# Patient Record
Sex: FEMALE | NOT HISPANIC OR LATINO | URBAN - METROPOLITAN AREA
[De-identification: names, ages, dates, MRNs, and addresses within clinical notes are randomized per-mention and may not be internally consistent; named-entity substitution may affect disease eponyms.]

---

## 2024-05-08 ENCOUNTER — DOCUMENTATION ONLY (OUTPATIENT)
Dept: GERIATRICS | Facility: CLINIC | Age: 84
End: 2024-05-08
Payer: MEDICARE

## 2024-05-09 ENCOUNTER — TRANSITIONAL CARE UNIT VISIT (OUTPATIENT)
Dept: GERIATRICS | Facility: CLINIC | Age: 84
End: 2024-05-09
Payer: MEDICARE

## 2024-05-09 VITALS
HEART RATE: 91 BPM | SYSTOLIC BLOOD PRESSURE: 154 MMHG | RESPIRATION RATE: 17 BRPM | HEIGHT: 64 IN | BODY MASS INDEX: 47.29 KG/M2 | TEMPERATURE: 97.2 F | OXYGEN SATURATION: 94 % | WEIGHT: 277 LBS | DIASTOLIC BLOOD PRESSURE: 76 MMHG

## 2024-05-09 DIAGNOSIS — R52 PAIN: ICD-10-CM

## 2024-05-09 DIAGNOSIS — M62.81 GENERALIZED MUSCLE WEAKNESS: ICD-10-CM

## 2024-05-09 DIAGNOSIS — I89.0 CHRONIC ACQUIRED LYMPHEDEMA: ICD-10-CM

## 2024-05-09 DIAGNOSIS — S06.5XAA SUBDURAL HEMATOMA (H): ICD-10-CM

## 2024-05-09 DIAGNOSIS — Z85.42 HISTORY OF UTERINE CANCER: ICD-10-CM

## 2024-05-09 DIAGNOSIS — Z99.89 USES WALKER: ICD-10-CM

## 2024-05-09 DIAGNOSIS — S02.40CD CLOSED FRACTURE OF RIGHT SIDE OF MAXILLA WITH ROUTINE HEALING, SUBSEQUENT ENCOUNTER: ICD-10-CM

## 2024-05-09 DIAGNOSIS — N18.31 STAGE 3A CHRONIC KIDNEY DISEASE (H): ICD-10-CM

## 2024-05-09 DIAGNOSIS — Z86.718 PERSONAL HISTORY OF DVT (DEEP VEIN THROMBOSIS): ICD-10-CM

## 2024-05-09 DIAGNOSIS — E66.01 MORBID OBESITY (H): ICD-10-CM

## 2024-05-09 DIAGNOSIS — R26.9 ABNORMAL GAIT: ICD-10-CM

## 2024-05-09 DIAGNOSIS — Z79.84 DIABETES MELLITUS TREATED WITH ORAL MEDICATION (H): ICD-10-CM

## 2024-05-09 DIAGNOSIS — Z79.4 TYPE 2 DIABETES MELLITUS WITH STAGE 3A CHRONIC KIDNEY DISEASE, WITH LONG-TERM CURRENT USE OF INSULIN (H): ICD-10-CM

## 2024-05-09 DIAGNOSIS — Z91.81 PERSONAL HISTORY OF FALL: ICD-10-CM

## 2024-05-09 DIAGNOSIS — S72.401D CLOSED FRACTURE OF DISTAL END OF RIGHT FEMUR WITH ROUTINE HEALING, UNSPECIFIED FRACTURE MORPHOLOGY, SUBSEQUENT ENCOUNTER: Primary | ICD-10-CM

## 2024-05-09 DIAGNOSIS — F32.A DEPRESSION, UNSPECIFIED DEPRESSION TYPE: ICD-10-CM

## 2024-05-09 DIAGNOSIS — I10 BENIGN ESSENTIAL HYPERTENSION: ICD-10-CM

## 2024-05-09 DIAGNOSIS — S32.010D COMPRESSION FRACTURE OF L1 VERTEBRA WITH ROUTINE HEALING, SUBSEQUENT ENCOUNTER: ICD-10-CM

## 2024-05-09 DIAGNOSIS — Z86.73 HISTORY OF TIA (TRANSIENT ISCHEMIC ATTACK) AND STROKE: ICD-10-CM

## 2024-05-09 DIAGNOSIS — E11.22 TYPE 2 DIABETES MELLITUS WITH STAGE 3A CHRONIC KIDNEY DISEASE, WITH LONG-TERM CURRENT USE OF INSULIN (H): ICD-10-CM

## 2024-05-09 DIAGNOSIS — S00.83XD CONTUSION OF OTHER PART OF HEAD, SUBSEQUENT ENCOUNTER: ICD-10-CM

## 2024-05-09 DIAGNOSIS — E11.9 DIABETES MELLITUS TREATED WITH ORAL MEDICATION (H): ICD-10-CM

## 2024-05-09 DIAGNOSIS — Z47.89 ORTHOPEDIC AFTERCARE: ICD-10-CM

## 2024-05-09 DIAGNOSIS — R53.81 PHYSICAL DECONDITIONING: ICD-10-CM

## 2024-05-09 DIAGNOSIS — S01.81XD FACIAL LACERATION, SUBSEQUENT ENCOUNTER: ICD-10-CM

## 2024-05-09 DIAGNOSIS — N18.31 TYPE 2 DIABETES MELLITUS WITH STAGE 3A CHRONIC KIDNEY DISEASE, WITH LONG-TERM CURRENT USE OF INSULIN (H): ICD-10-CM

## 2024-05-09 PROCEDURE — 99310 SBSQ NF CARE HIGH MDM 45: CPT | Performed by: NURSE PRACTITIONER

## 2024-05-09 RX ORDER — SENNOSIDES A AND B 8.6 MG/1
8.6 TABLET, FILM COATED ORAL 2 TIMES DAILY
COMMUNITY
Start: 2024-05-08

## 2024-05-09 RX ORDER — PIOGLITAZONEHYDROCHLORIDE 30 MG/1
1 TABLET ORAL EVERY MORNING
COMMUNITY
Start: 2023-04-11

## 2024-05-09 RX ORDER — ATORVASTATIN CALCIUM 20 MG/1
1 TABLET, FILM COATED ORAL AT BEDTIME
COMMUNITY
Start: 2023-04-11

## 2024-05-09 RX ORDER — OXYCODONE HYDROCHLORIDE 5 MG/1
5 TABLET ORAL 2 TIMES DAILY
COMMUNITY
Start: 2024-05-08 | End: 2024-05-14

## 2024-05-09 RX ORDER — NYSTATIN 100000 U/G
CREAM TOPICAL 2 TIMES DAILY
COMMUNITY
End: 2024-05-28

## 2024-05-09 RX ORDER — MAGNESIUM OXIDE 400 MG/1
400 TABLET ORAL DAILY
COMMUNITY

## 2024-05-09 RX ORDER — ANTACID TABLETS 500 MG/1
2 TABLET, CHEWABLE ORAL 2 TIMES DAILY
COMMUNITY
End: 2024-05-09

## 2024-05-09 RX ORDER — MULTIVIT-MIN/FA/LYCOPEN/LUTEIN .4-300-25
1 TABLET ORAL DAILY
COMMUNITY
Start: 2024-05-09

## 2024-05-09 RX ORDER — FAMOTIDINE 40 MG/1
20 TABLET, FILM COATED ORAL 2 TIMES DAILY
COMMUNITY
Start: 2023-04-11

## 2024-05-09 RX ORDER — GLIMEPIRIDE 1 MG/1
2 TABLET ORAL
COMMUNITY

## 2024-05-09 RX ORDER — CALCIUM CARBONATE 500(1250)
500 TABLET ORAL 2 TIMES DAILY
COMMUNITY
Start: 2024-05-08

## 2024-05-09 RX ORDER — FUROSEMIDE 20 MG
20 TABLET ORAL DAILY
COMMUNITY
Start: 2024-04-11

## 2024-05-09 RX ORDER — CITALOPRAM HYDROBROMIDE 10 MG/1
1 TABLET ORAL EVERY MORNING
COMMUNITY
Start: 2023-04-11

## 2024-05-09 RX ORDER — ERGOCALCIFEROL 1.25 MG/1
50000 CAPSULE, LIQUID FILLED ORAL WEEKLY
COMMUNITY
End: 2024-06-13

## 2024-05-09 RX ORDER — POLYETHYLENE GLYCOL 3350 17 G/17G
17 POWDER, FOR SOLUTION ORAL DAILY
COMMUNITY
Start: 2024-05-08

## 2024-05-09 NOTE — PROGRESS NOTES
[facility] GERIATRICS    PRIMARY CARE PROVIDER AND CLINIC:  Physician No Ref-Primary, No address on file  Chief Complaint   Patient presents with    Hospital F/U      [place] Medical Record Number:  [id number]  Place of Service where encounter took place:  [facility] (TCU) [[id number]]    Alana Sheikh  is a 84 year old  (1940), admitted to the above facility from  [facility]. Hospital stay  5/1/2024 through 5/8/2024..       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  Not on anticoagulation    On 5/1/2024, patient presented in the [facility] emergency room after mechanical fall resulting in facial trauma, SDH and a right femur fracture.  She was transferred to [facility].   She underwent IMN on 5/2/2024.  Anticoagulation was held due to head bleed and has follow-up with neurosurgery on 5/10/2024.    Today she was in her room with her nephew who lives in [facility]  He has ecchymosis on her right face  Laceration on above left ey  Bandage covering incision      CODE STATUS/ADVANCE DIRECTIVES DISCUSSION:  No Order  CPR/Full code   ALLERGIES:   Allergies   Allergen Reactions    Metformin       PAST MEDICAL HISTORY: No past medical history on file.   PAST SURGICAL HISTORY:   has no past surgical history on file.  FAMILY HISTORY: family history is not on file.  SOCIAL HISTORY:     Patient's living condition: lives in an assisted living facility    Post Discharge Medication  "Reconciliation Status:   MED REC REQUIRED  Post Medication Reconciliation Status:  Discharge medications reconciled and changed, see notes/orders       Current Outpatient Medications   Medication Sig Dispense Refill    acetaminophen 500 MG CAPS Take 2 capsules by mouth 3 times daily      atorvastatin (LIPITOR) 20 MG tablet Take 1 tablet by mouth at bedtime      calcium carbonate 500 mg, elemental, (OSCAL) 500 MG tablet Take 500 mg by mouth 2 times daily      citalopram (CELEXA) 10 MG tablet Take 1 tablet by mouth every morning      famotidine (PEPCID) 40 MG tablet Take 20 mg by mouth 2 times daily      furosemide (LASIX) 20 MG tablet Take 20 mg by mouth daily as needed (weight gain or leg swelling)      glimepiride (AMARYL) 1 MG tablet Take 2 mg by mouth every morning (before breakfast)      magnesium oxide (MAG-OX) 400 MG tablet Take 400 mg by mouth daily      Multiple Vitamins-Minerals (CEROVITE SENIOR) TABS Take 1 tablet by mouth daily      nystatin (MYCOSTATIN) 226386 UNIT/GM external cream Apply topically 2 times daily      oxyCODONE (ROXICODONE) 5 MG tablet Take 5 mg by mouth 2 times daily      pioglitazone (ACTOS) 30 MG tablet Take 1 tablet by mouth every morning      polyethylene glycol (PEG 3350) 17 g packet Take 17 g by mouth daily      senna (SENOKOT) 8.6 MG tablet Take 8.6 mg by mouth 2 times daily      vitamin D2 (ERGOCALCIFEROL) 65656 units (1250 mcg) capsule Take 50,000 Units by mouth once a week       No current facility-administered medications for this visit.       ROS:  4 point ROS including Respiratory, CV, GI and , other than that noted in the HPI,  is negative    Vitals:  BP (!) 154/76   Pulse 91   Temp 97.2  F (36.2  C)   Resp 17   Ht 1.615 m (5' 3.6\")   Wt 125.6 kg (277 lb)   SpO2 94%   BMI 48.15 kg/m    Exam:  GENERAL APPEARANCE:  Alert, in no distress, morbidly obese  RESP:  lungs clear to auscultation , no respiratory distress  CV:  Palpation and auscultation of heart done , " rate-normal  M/S:   right leg wrapped and has a brace  SKIN:  incisions on right leg covered with CDI dressing  PSYCH:  oriented X 3    Lab/Diagnostic data:  Recent labs in [facility] reviewed by me today.     ASSESSMENT/PLAN:    (S75.075D) Closed fracture of distal end of right femur with routine healing, unspecified fracture morphology, subsequent encounter  (primary encounter diagnosis)  (Z47.89) Orthopedic aftercare  (M62.81) Generalized muscle weakness  (R53.81) Physical deconditioning  (Z91.81) Personal history of fall  (R26.9) Abnormal gait  (Z99.89) Uses walker  (R52) Pain  Comment:   On 5/1/2024, patient fell resulting in facial trauma, SDH and a right femur fracture.  She was transferred to [facility].   She underwent IMN on 5/2/2024.   DVT prophylaxis - on hold due to SAH.  Sees neurology on 05/10/24  Pain Medications - has oxycodone prn and scheduled Tylenol 1000 mg TID   Bowel Medications - takes senna BID and miralax   Bone Health - takes calcium and Vitamin D  Weight bearing status - NWB RLE, no aggressive knee motion.    Incision - covered with CDI dressing  Follow up - 2 weeks with either [facility] ortho or  ortho NP/PA with xrays prior AP/ Pelvis, Ap/ Lateral of the hip   Follow up - 6 weeks with either [facility] ortho or [facility]o NP/PA with  xrays prior AP/ Pelvis, Ap/ Lateral of the hip   Plan: BMP and CBC next lab day  Therapy to evaluate and treat  [facility] Orthopedic Date & Time of Appointment: 5/17/24 @8:50 AM Address: [address] [place] [address],    (S06.5XAA) Subdural hematoma (H)  (S02.40CD) Closed fracture of right side of maxilla with routine healing, subsequent encounter  (S00.83XD) Contusion of other part of head, subsequent encounter  (S01.81XD) Facial laceration, subsequent encounter  (Z86.73) History of TIA (transient ischemic attack) and stroke  (Z86.718) Personal history of DVT (deep vein thrombosis)  Comment:   pt fell hit head was on Plavix at that time. S/p fall on no anticoagulin at this time.   Per  ENT, Fracture only minimally displaced and non-operative management was recommended with soft diet.   Pt received 2 unit PRBC in hospital  Plan:    [facility] Date & Time of Appointment: 5/10/24 @ 7:30 AM Phone Number: [phone] Address: [address] 4th floor   Kendrick Chrissy REEDER Date & Time of Appointment: 5/10/24 @ 8:10 AM Phone Number: [phone] Address: [address] 3rd floor [facility] Date & Time of Appointment: 5/10/24 @ 9:10 AM Phone Number: [phone] Address: [address] [place], [place]  Dental referral for new upper dentures    (S32.010D) Compression fracture of L1 vertebra with routine healing, subsequent encounter  Comment: chronic  Tylenol for pain   Plan: Continue with plan of care no changes at this time, adjustment as needed      (I10) Benign essential hypertension  Comment: chronic  Taking lisinopril  Plan: monitor BMP    (N18.31) Stage 3a chronic kidney disease (H)  Comment: chronic  Baseline Cr ~0.8, GFR >60   Plan: avoid nephrotoxic medications  Monitor BMP     (E11.22,  N18.31,  Z79.4) Type 2 diabetes mellitus with stage 3a chronic kidney disease, with long-term current use of insulin (H)  (E66.01) Morbid obesity (H)  (E11.9,  Z79.84) Diabetes mellitus treated with oral medication (H)  Comment: chronic  HgbA1c 6.9% 5/3/24   Taking Actos and Amaryl   Plan: monitor blood sugars     (I89.0) Chronic acquired lymphedema  Comment: chronic  Taking levothyroxine.   Plan: monitor TSH      (F32.A) Depression, unspecified depression type  Comment: chronic.   Taking citalopram  Plan: Continue with plan of care no changes at this time, adjustment as needed      (Z85.42) History of uterine cancer  Comment: hx of   Plan: Continue with plan of care no changes at this time, adjustment as needed        Total time spent with patient visit at the skilled nursing facility was 45 min including patient visit, review of past records, and [facility] hospital records.     Electronically  signed by:      Althea Crowder, CLIF CNP on 5/9/2024 at 5:04 PM

## 2024-05-09 NOTE — LETTER
5/9/2024        RE: Alana Sheikh  [address]  [place] [address]        [facility] GERIATRICS    PRIMARY CARE PROVIDER AND CLINIC:  Physician No Ref-Primary, No address on file  Chief Complaint   Patient presents with     Hospital F/U      [place] Medical Record Number:  [id number]  Place of Service where encounter took place:  [facility] (TCU) [[id number]]    Alana Sheikh  is a 84 year old  (1940), admitted to the above facility from  [facility]. Hospital stay  5/1/2024 through 5/8/2024..       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  Not on anticoagulation    On 5/1/2024, patient presented in the [facility] emergency room after mechanical fall resulting in facial trauma, SDH and a right femur fracture.  She was transferred to [facility].   She underwent IMN on 5/2/2024.  Anticoagulation was held due to head bleed and has follow-up with neurosurgery on 5/10/2024.    Today she was in her room with her nephew who lives in [facility]  He has ecchymosis on her right face  Laceration on above left ey  Bandage covering incision      CODE STATUS/ADVANCE DIRECTIVES DISCUSSION:  No Order  CPR/Full code   ALLERGIES:   Allergies   Allergen Reactions     Metformin       PAST MEDICAL HISTORY: No past medical history on file.   PAST SURGICAL HISTORY:   has no past surgical history on file.  FAMILY HISTORY: family history is not on file.  SOCIAL HISTORY:     Patient's  "living condition: lives in an assisted living facility    Post Discharge Medication Reconciliation Status:   MED REC REQUIRED  Post Medication Reconciliation Status:  Discharge medications reconciled and changed, see notes/orders       Current Outpatient Medications   Medication Sig Dispense Refill     acetaminophen 500 MG CAPS Take 2 capsules by mouth 3 times daily       atorvastatin (LIPITOR) 20 MG tablet Take 1 tablet by mouth at bedtime       calcium carbonate 500 mg, elemental, (OSCAL) 500 MG tablet Take 500 mg by mouth 2 times daily       citalopram (CELEXA) 10 MG tablet Take 1 tablet by mouth every morning       famotidine (PEPCID) 40 MG tablet Take 20 mg by mouth 2 times daily       furosemide (LASIX) 20 MG tablet Take 20 mg by mouth daily as needed (weight gain or leg swelling)       glimepiride (AMARYL) 1 MG tablet Take 2 mg by mouth every morning (before breakfast)       magnesium oxide (MAG-OX) 400 MG tablet Take 400 mg by mouth daily       Multiple Vitamins-Minerals (CEROVITE SENIOR) TABS Take 1 tablet by mouth daily       nystatin (MYCOSTATIN) 243776 UNIT/GM external cream Apply topically 2 times daily       oxyCODONE (ROXICODONE) 5 MG tablet Take 5 mg by mouth 2 times daily       pioglitazone (ACTOS) 30 MG tablet Take 1 tablet by mouth every morning       polyethylene glycol (PEG 3350) 17 g packet Take 17 g by mouth daily       senna (SENOKOT) 8.6 MG tablet Take 8.6 mg by mouth 2 times daily       vitamin D2 (ERGOCALCIFEROL) 96596 units (1250 mcg) capsule Take 50,000 Units by mouth once a week       No current facility-administered medications for this visit.       ROS:  4 point ROS including Respiratory, CV, GI and , other than that noted in the HPI,  is negative    Vitals:  BP (!) 154/76   Pulse 91   Temp 97.2  F (36.2  C)   Resp 17   Ht 1.615 m (5' 3.6\")   Wt 125.6 kg (277 lb)   SpO2 94%   BMI 48.15 kg/m    Exam:  GENERAL APPEARANCE:  Alert, in no distress, morbidly obese  RESP:  lungs " clear to auscultation , no respiratory distress  CV:  Palpation and auscultation of heart done , rate-normal  M/S:   right leg wrapped and has a brace  SKIN:  incisions on right leg covered with CDI dressing  PSYCH:  oriented X 3    Lab/Diagnostic data:  Recent labs in [facility] reviewed by me today.     ASSESSMENT/PLAN:    (S72.797D) Closed fracture of distal end of right femur with routine healing, unspecified fracture morphology, subsequent encounter  (primary encounter diagnosis)  (Z47.89) Orthopedic aftercare  (M62.81) Generalized muscle weakness  (R53.81) Physical deconditioning  (Z91.81) Personal history of fall  (R26.9) Abnormal gait  (Z99.89) Uses walker  (R52) Pain  Comment:   On 5/1/2024, patient fell resulting in facial trauma, SDH and a right femur fracture.  [place] [facility]   She underwent IMN on 5/2/2024.   DVT prophylaxis - on hold due to SAH.  Sees neurology on 05/10/24  Pain Medications - has oxycodone prn and scheduled Tylenol 1000 mg TID   Bowel Medications - takes senna BID and miralax   Bone Health - takes calcium and Vitamin D  Weight bearing status - NWB RLE, no aggressive knee motion.    Incision - covered with CDI dressing  Follow up - 2 weeks with either [facility] ortho or [facility]o NP/PA with xrays prior AP/ Pelvis, Ap/ Lateral of the hip   Follow up - 6 weeks with either [facility] ortho or [facility] ortho NP/PA with  xrays prior AP/ Pelvis, Ap/ Lateral of the hip   Plan: BMP and CBC next lab day  Therapy to evaluate and treat  [facility] Date & Time of Appointment: 5/17/24 @8:50 AM Address: [address] [place] [address],    (S06.5XAA) Subdural hematoma (H)  (S02.40CD) Closed fracture of right side of maxilla with routine healing, subsequent encounter  (S00.83XD) Contusion of other part of head, subsequent encounter  (S01.81XD) Facial laceration, subsequent encounter  (Z86.73) History of TIA (transient ischemic attack) and stroke  (Z86.718) Personal history of DVT (deep vein thrombosis)  Comment:    pt fell hit head was on Plavix at that time. S/p fall on no anticoagulin at this time.   Per ENT, Fracture only minimally displaced and non-operative management was recommended with soft diet.   Pt received 2 unit PRBC in hospital  Plan:    [facility] Date & Time of Appointment: 5/10/24 @ 7:30 AM Phone Number: [phone] Address: [address] 4[address] floor   Kendrick Lowe PA-C Date & Time of Appointment: 5/10/24 @ 8:10 AM Phone Number: [phone] Address: [address] [facility] Date & Time of Appointment: 5/10/24 @ 9:10 AM Phone Number: [phone] Address: [address] [place], [place]  Dental referral for new upper dentures    (S32.010D) Compression fracture of L1 vertebra with routine healing, subsequent encounter  Comment: chronic  Tylenol for pain   Plan: Continue with plan of care no changes at this time, adjustment as needed      (I10) Benign essential hypertension  Comment: chronic  Taking lisinopril  Plan: monitor BMP    (N18.31) Stage 3a chronic kidney disease (H)  Comment: chronic  Baseline Cr ~0.8, GFR >60   Plan: avoid nephrotoxic medications  Monitor BMP     (E11.22,  N18.31,  Z79.4) Type 2 diabetes mellitus with stage 3a chronic kidney disease, with long-term current use of insulin (H)  (E66.01) Morbid obesity (H)  (E11.9,  Z79.84) Diabetes mellitus treated with oral medication (H)  Comment: chronic  HgbA1c 6.9% 5/3/24   Taking Actos and Amaryl   Plan: monitor blood sugars     (I89.0) Chronic acquired lymphedema  Comment: chronic  Taking levothyroxine.   Plan: monitor TSH      (F32.A) Depression, unspecified depression type  Comment: chronic.   Taking citalopram  Plan: Continue with plan of care no changes at this time, adjustment as needed      (Z85.42) History of uterine cancer  Comment: hx of   Plan: Continue with plan of care no changes at this time, adjustment as needed        Total time spent with patient visit at the skilled nursing facility was 45 min  including patient visit, review of past records, and [facility] [place].     Electronically signed by:      CLIF Jade CNP on 5/9/2024 at 5:04 PM                     Sincerely,        CLIF Jade CNP

## 2024-05-10 ENCOUNTER — TELEPHONE (OUTPATIENT)
Dept: GERIATRICS | Facility: CLINIC | Age: 84
End: 2024-05-10
Payer: MEDICARE

## 2024-05-10 NOTE — PROGRESS NOTES
[place] GERIATRIC SERVICES TELEPHONE ENCOUNTER    No chief complaint on file.    Alana Sheikh is a 84 year old  (1940),Nurse called today to report: Reported coffee ground emesis and did not contact provider until this morning. abdomen distended with nausea and pain present.    ASSESSMENT/PLAN  send out to hospital further needs.   Electronically signed by:   CLIF Hester CNP

## 2024-05-13 ENCOUNTER — TRANSITIONAL CARE UNIT VISIT (OUTPATIENT)
Dept: GERIATRICS | Facility: CLINIC | Age: 84
End: 2024-05-13
Payer: MEDICARE

## 2024-05-13 VITALS
BODY MASS INDEX: 47.29 KG/M2 | OXYGEN SATURATION: 94 % | HEIGHT: 64 IN | TEMPERATURE: 97.3 F | DIASTOLIC BLOOD PRESSURE: 58 MMHG | RESPIRATION RATE: 22 BRPM | HEART RATE: 93 BPM | SYSTOLIC BLOOD PRESSURE: 130 MMHG | WEIGHT: 277 LBS

## 2024-05-13 DIAGNOSIS — N18.31 STAGE 3A CHRONIC KIDNEY DISEASE (H): ICD-10-CM

## 2024-05-13 DIAGNOSIS — S02.40CD CLOSED FRACTURE OF RIGHT SIDE OF MAXILLA WITH ROUTINE HEALING, SUBSEQUENT ENCOUNTER: ICD-10-CM

## 2024-05-13 DIAGNOSIS — K59.03 DRUG-INDUCED CONSTIPATION: Primary | ICD-10-CM

## 2024-05-13 DIAGNOSIS — E66.01 MORBID OBESITY (H): ICD-10-CM

## 2024-05-13 DIAGNOSIS — S32.010D COMPRESSION FRACTURE OF L1 VERTEBRA WITH ROUTINE HEALING, SUBSEQUENT ENCOUNTER: ICD-10-CM

## 2024-05-13 DIAGNOSIS — Z79.84 DIABETES MELLITUS TREATED WITH ORAL MEDICATION (H): ICD-10-CM

## 2024-05-13 DIAGNOSIS — M62.81 GENERALIZED MUSCLE WEAKNESS: ICD-10-CM

## 2024-05-13 DIAGNOSIS — R52 PAIN: ICD-10-CM

## 2024-05-13 DIAGNOSIS — F32.A DEPRESSION, UNSPECIFIED DEPRESSION TYPE: ICD-10-CM

## 2024-05-13 DIAGNOSIS — Z79.4 TYPE 2 DIABETES MELLITUS WITH STAGE 3A CHRONIC KIDNEY DISEASE, WITH LONG-TERM CURRENT USE OF INSULIN (H): ICD-10-CM

## 2024-05-13 DIAGNOSIS — S06.5XAA SUBDURAL HEMATOMA (H): ICD-10-CM

## 2024-05-13 DIAGNOSIS — N18.31 TYPE 2 DIABETES MELLITUS WITH STAGE 3A CHRONIC KIDNEY DISEASE, WITH LONG-TERM CURRENT USE OF INSULIN (H): ICD-10-CM

## 2024-05-13 DIAGNOSIS — Z47.89 ORTHOPEDIC AFTERCARE: ICD-10-CM

## 2024-05-13 DIAGNOSIS — R53.81 PHYSICAL DECONDITIONING: ICD-10-CM

## 2024-05-13 DIAGNOSIS — E11.22 TYPE 2 DIABETES MELLITUS WITH STAGE 3A CHRONIC KIDNEY DISEASE, WITH LONG-TERM CURRENT USE OF INSULIN (H): ICD-10-CM

## 2024-05-13 DIAGNOSIS — I10 BENIGN ESSENTIAL HYPERTENSION: ICD-10-CM

## 2024-05-13 DIAGNOSIS — Z91.81 PERSONAL HISTORY OF FALL: ICD-10-CM

## 2024-05-13 DIAGNOSIS — E11.9 DIABETES MELLITUS TREATED WITH ORAL MEDICATION (H): ICD-10-CM

## 2024-05-13 DIAGNOSIS — Z85.42 HISTORY OF UTERINE CANCER: ICD-10-CM

## 2024-05-13 DIAGNOSIS — S00.83XD CONTUSION OF OTHER PART OF HEAD, SUBSEQUENT ENCOUNTER: ICD-10-CM

## 2024-05-13 DIAGNOSIS — S01.81XD FACIAL LACERATION, SUBSEQUENT ENCOUNTER: ICD-10-CM

## 2024-05-13 DIAGNOSIS — I89.0 CHRONIC ACQUIRED LYMPHEDEMA: ICD-10-CM

## 2024-05-13 DIAGNOSIS — S72.401D CLOSED FRACTURE OF DISTAL END OF RIGHT FEMUR WITH ROUTINE HEALING, UNSPECIFIED FRACTURE MORPHOLOGY, SUBSEQUENT ENCOUNTER: ICD-10-CM

## 2024-05-13 PROCEDURE — 99309 SBSQ NF CARE MODERATE MDM 30: CPT | Performed by: NURSE PRACTITIONER

## 2024-05-13 NOTE — LETTER
5/13/2024        RE: Alana Sheikh  [address] [facility]  [place] [address]        [facility] GERIATRICS    PRIMARY CARE PROVIDER AND CLINIC:  Physician No Ref-Primary, No address on file  Chief Complaint   Patient presents with     RECHECK      [facility] Medical Record Number:  [id number]  Place of Service where encounter took place:  [facility] (TCU) [[id number]]    Alana Sheikh  is a 84 year old  (1940), admitted to the above facility from  [facility]. Hospital stay 5/10/2024 through 5/11/2024..     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  Not on anticoagulation     On 5/1/2024, patient presented in the [facility] emergency room after mechanical fall resulting in facial trauma, SDH and a right femur fracture.  She was transferred to [facility].   She underwent IMN on 5/2/2024.  Anticoagulation was held due to head bleed and has follow-up with neurosurgery on 5/10/2024.    In 5/10/2024, patient was readmitted to the hospital concern of small bowel obstruction.  She did have large bowel movement in the emergency room.    Patient was seen with no concerns    CODE STATUS/ADVANCE DIRECTIVES DISCUSSION:  No Order  DNR / DNI  ALLERGIES:   Allergies   Allergen Reactions     Metformin       PAST MEDICAL HISTORY: No past medical history on file.   PAST SURGICAL HISTORY:   has no past surgical history on file.  FAMILY HISTORY: family history is not on file.  SOCIAL HISTORY:      Patient's living condition: lives in an assisted living facility    Post Discharge Medication Reconciliation Status:   MED REC REQUIRED  Post Medication Reconciliation Status:  Discharge medications reconciled and changed, see notes/orders       Current Outpatient Medications   Medication Sig Dispense Refill     acetaminophen 500 MG CAPS Take 2 capsules by mouth 3 times daily       atorvastatin (LIPITOR) 20 MG tablet Take 1 tablet by mouth at bedtime       calcium carbonate 500 mg, elemental, (OSCAL) 500 MG tablet Take 500 mg by mouth 2 times daily       citalopram (CELEXA) 10 MG tablet Take 1 tablet by mouth every morning       clopidogrel (PLAVIX) 75 MG tablet Take 75 mg by mouth daily       enoxaparin ANTICOAGULANT (LOVENOX) 40 MG/0.4ML syringe Inject 40 mg Subcutaneous daily DVT ppx       famotidine (PEPCID) 40 MG tablet Take 20 mg by mouth 2 times daily       furosemide (LASIX) 20 MG tablet Take 20 mg by mouth daily as needed (weight gain or leg swelling)       glimepiride (AMARYL) 1 MG tablet Take 2 mg by mouth every morning (before breakfast)       lisinopril (ZESTRIL) 5 MG tablet Take 5 mg by mouth at bedtime       magnesium oxide (MAG-OX) 400 MG tablet Take 400 mg by mouth daily       Multiple Vitamins-Minerals (CEROVITE SENIOR) TABS Take 1 tablet by mouth daily       nystatin (MYCOSTATIN) 658863 UNIT/GM external cream Apply topically 2 times daily       pioglitazone (ACTOS) 30 MG tablet Take 1 tablet by mouth every morning       polyethylene glycol (PEG 3350) 17 g packet Take 17 g by mouth daily       senna (SENOKOT) 8.6 MG tablet Take 8.6 mg by mouth 2 times daily       vitamin D2 (ERGOCALCIFEROL) 00979 units (1250 mcg) capsule Take 50,000 Units by mouth once a week       No current facility-administered medications for this visit.       ROS:  4 point ROS including Respiratory, CV, GI and , other than that noted in the HPI,  is negative    Vitals:  /58   Pulse 93   Temp 97.3  F (36.3  C)    "Resp 22   Ht 1.615 m (5' 3.6\")   Wt 125.6 kg (277 lb)   SpO2 94%   BMI 48.15 kg/m    Exam:  GENERAL APPEARANCE:  Alert, in no distress, morbidly obese  RESP:  lungs clear to auscultation , no respiratory distress  CV:  Palpation and auscultation of heart done , rate-normal  PSYCH:  oriented X 3    Lab/Diagnostic data:  Recent labs in [facility] reviewed by me today.     ASSESSMENT/PLAN:      (X15.514R) Closed fracture of distal end of right femur with routine healing, unspecified fracture morphology, subsequent encounter  (primary encounter diagnosis)  (Z47.89) Orthopedic aftercare  (M62.81) Generalized muscle weakness  (R53.81) Physical deconditioning  (Z91.81) Personal history of fall  (R26.9) Abnormal gait  (Z99.89) Uses walker  (R52) Pain  Comment:   On 5/1/2024, patient fell resulting in facial trauma, SDH and a right femur fracture.  [place] was [place] [facility]   She underwent IMN on 5/2/2024.   DVT prophylaxis - on hold due to SAH.  Sees neurology on 05/10/24  Pain Medications - has oxycodone prn and scheduled Tylenol 1000 mg TID   Bowel Medications - takes senna BID and miralax   Bone Health - takes calcium and Vitamin D  Weight bearing status - NWB RLE, no aggressive knee motion.    Incision - covered with CDI dressing  Follow up - 2 weeks with either [facility] ortho or  ortho NP/PA with xrays prior AP/ Pelvis, Ap/ Lateral of the hip   Follow up - 6 weeks with either [facility] ortho or  ortho NP/PA with  xrays prior AP/ Pelvis, Ap/ Lateral of the hip   Working with therapy   Plan: BMP and CBC next lab day  Therapy to evaluate and treat  [facility] Date & Time of Appointment: 5/17/24 @8:50 AM Address: [address] [place] [address],     (S06.5XAA) Subdural hematoma (H)  (S02.40CD) Closed fracture of right side of maxilla with routine healing, subsequent encounter  (S00.83XD) Contusion of other part of head, subsequent encounter  (S01.81XD) Facial laceration, subsequent encounter  (Z86.73) History of TIA (transient " ischemic attack) and stroke  (Z86.718) Personal history of DVT (deep vein thrombosis)  Comment:   pt fell hit head was on Plavix at that time. S/p fall on no anticoagulin at this time.   Per ENT, Fracture only minimally displaced and non-operative management was recommended with soft diet.   Pt received 2 unit PRBC in hospital  Plan:    [facility] Date & Time of Appointment: 5/10/24 @ 7:30 AM Phone Number: [phone] Address: [address] 4th floor   Kendrick Lowe PA-C Date & Time of Appointment: 5/10/24 @ 8:10 AM Phone Number: [phone] Address: [address] 3rd floor [facility] Date & Time of Appointment: 5/10/24 @ 9:10 AM Phone Number: [phone] Address: [address] [place], [place]  Dental referral for new upper dentures     (S32.010D) Compression fracture of L1 vertebra with routine healing, subsequent encounter  Comment: chronic  Tylenol for pain   Plan: Continue with plan of care no changes at this time, adjustment as needed        (I10) Benign essential hypertension  Comment: chronic  Taking lisinopril  Plan: monitor BMP     (N18.31) Stage 3a chronic kidney disease (H)  Comment: chronic  Baseline Cr ~0.8, GFR >60   Personally reviewed her BMP from 5/10/2024 and creatinine was stable  Plan: avoid nephrotoxic medications       (E11.22,  N18.31,  Z79.4) Type 2 diabetes mellitus with stage 3a chronic kidney disease, with long-term current use of insulin (H)  (E66.01) Morbid obesity (H)  (E11.9,  Z79.84) Diabetes mellitus treated with oral medication (H)  Comment: chronic  HgbA1c 6.9% 5/3/24   Taking Actos and Amaryl   Plan: monitor blood sugars      (I89.0) Chronic acquired lymphedema  Comment: chronic  Taking levothyroxine.   Plan: monitor TSH yearly        (F32.A) Depression, unspecified depression type  Comment: chronic.   Taking citalopram  Plan: Continue with plan of care no changes at this time, adjustment as needed        (Z85.42) History of uterine cancer  Comment: hx of    Plan: Continue with plan of care no changes at this time, adjustment as needed     (K59.03) drug-induced constipation  Comment: Patient was hospitalized with potential small bowel obstruction did have bowel movement in the hospital.  Currently taking senna twice daily  Comment: Continue with plan of care no changes at this time, adjustment as needed      Electronically signed by:  CLIF Jade CNP                  Sincerely,        CLIF Jade CNP

## 2024-05-14 ENCOUNTER — TRANSITIONAL CARE UNIT VISIT (OUTPATIENT)
Dept: GERIATRICS | Facility: CLINIC | Age: 84
End: 2024-05-14
Payer: MEDICARE

## 2024-05-14 VITALS
TEMPERATURE: 97.3 F | SYSTOLIC BLOOD PRESSURE: 130 MMHG | OXYGEN SATURATION: 94 % | HEIGHT: 64 IN | WEIGHT: 277 LBS | HEART RATE: 93 BPM | DIASTOLIC BLOOD PRESSURE: 58 MMHG | BODY MASS INDEX: 47.29 KG/M2 | RESPIRATION RATE: 22 BRPM

## 2024-05-14 DIAGNOSIS — S02.42XD: ICD-10-CM

## 2024-05-14 DIAGNOSIS — R53.81 PHYSICAL DECONDITIONING: ICD-10-CM

## 2024-05-14 DIAGNOSIS — D62 ANEMIA DUE TO BLOOD LOSS, ACUTE: ICD-10-CM

## 2024-05-14 DIAGNOSIS — K59.01 SLOW TRANSIT CONSTIPATION: ICD-10-CM

## 2024-05-14 DIAGNOSIS — Z86.73 HISTORY OF TIA (TRANSIENT ISCHEMIC ATTACK) AND STROKE: ICD-10-CM
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
## 2024-05-14 DIAGNOSIS — I89.0 LYMPHEDEMA: ICD-10-CM

## 2024-05-14 DIAGNOSIS — I12.9 BENIGN HYPERTENSIVE KIDNEY DISEASE WITH CHRONIC KIDNEY DISEASE STAGE I THROUGH STAGE IV, OR UNSPECIFIED: ICD-10-CM

## 2024-05-14 DIAGNOSIS — Z47.89 AFTERCARE FOLLOWING SURGERY OF THE MUSCULOSKELETAL SYSTEM: Primary | ICD-10-CM

## 2024-05-14 DIAGNOSIS — N18.31 STAGE 3A CHRONIC KIDNEY DISEASE (H): ICD-10-CM

## 2024-05-14 DIAGNOSIS — F33.0 MILD RECURRENT MAJOR DEPRESSION (H): ICD-10-CM

## 2024-05-14 DIAGNOSIS — S72.401D CLOSED FRACTURE OF DISTAL END OF RIGHT FEMUR WITH ROUTINE HEALING, UNSPECIFIED FRACTURE MORPHOLOGY, SUBSEQUENT ENCOUNTER: ICD-10-CM

## 2024-05-14 DIAGNOSIS — E11.69 TYPE 2 DIABETES MELLITUS WITH OTHER SPECIFIED COMPLICATION, WITHOUT LONG-TERM CURRENT USE OF INSULIN (H): ICD-10-CM

## 2024-05-14 DIAGNOSIS — S06.5X9D TRAUMATIC SUBDURAL HEMATOMA WITH LOSS OF CONSCIOUSNESS, SUBSEQUENT ENCOUNTER: ICD-10-CM

## 2024-05-14 PROCEDURE — 99305 1ST NF CARE MODERATE MDM 35: CPT | Performed by: INTERNAL MEDICINE

## 2024-05-14 RX ORDER — ENOXAPARIN SODIUM 100 MG/ML
40 INJECTION SUBCUTANEOUS DAILY
COMMUNITY

## 2024-05-14 RX ORDER — LISINOPRIL 5 MG/1
5 TABLET ORAL AT BEDTIME
COMMUNITY

## 2024-05-14 RX ORDER — CLOPIDOGREL BISULFATE 75 MG/1
75 TABLET ORAL DAILY
COMMUNITY

## 2024-05-14 NOTE — PROGRESS NOTES
[facility] GERIATRICS  INITIAL VISIT NOTE  May 14, 2024    PRIMARY CARE PROVIDER AND CLINIC:  No Ref-Primary, Physician No address on file    [facility] Medical Record Number:  [id number]  Place of Service where encounter took place:  [facility] ([facility]) [[id number]]    Chief Complaint   Patient presents with    Hospital F/U       HPI:    Alana Sheikh is a 84 year old  (1940) female seen today at [facility] TCU. Medical history is notable for HTN, DM II, CKD III, lymphedema and TIA. She was transferred from [facility] to [facility] where she was hospitalized  from 5/1/24 to 5/8/24 with multiple trauma after a mechanical fall:  comminuted distal R femur fracture, R tentorial SDH, R maxillary alveolar ridge fracture, R facial contusions, left eyebrow laceration and chronic L1 compression fracture. She is s/p IMN (5/2/24) for the femur fracture. ENT recommended non-op management of facial fractures. She was transfused 2 units PRBCs for dominic Hgb 7.1 in setting of above. PTA clopidogrel held due to SDH pending neurosurgery follow up. She was admitted to this facility for  rehab, medical management, and nursing care.      History obtained from: facility chart records, facility staff, patient report, [facility] chart review, and [facility] chart review.      She was seen in the [facility] ER on 5/10/24 with nausea/emesis and abdominal pain. Initial concern for SBO; however, imaging without evidence of this and she was able to have a large BM in the ER and serial abdominal exams were reassuring Repeat CT head with resolution of SDH and PTA clopidogrel was resumed along with enoxaparin for DVT ppx.     Today, Ms. Sheikh is seen in her room sitting up in bed. Overall is doing OK - having a hard time believing how many things she broke with that fall. Pain is overall controlled. She wants to be able to start walking on that leg. No chest pain. No dyspnea. No belly pain. Having  Anel. The incentive spirometer is on her bedside table and she reports using it infrequently - encouraged her to use it 2-3 slow deep breaths at least TID. No acute concerns today per discussion with nursing. She is working with therapies.     CODE STATUS: DNR / DNI    ALLERGIES:  Allergies   Allergen Reactions    Metformin        PAST MEDICAL HISTORY:   HTN, DM II, CKD III, lymphedema and TIA    PAST SURGICAL HISTORY:   No past surgical history on file.    SOCIAL HISTORY:   Patient's living condition: lives in an assisted living facility    MEDICATIONS:  Post Discharge Medication Reconciliation Status: discharge medications reconciled and changed, per note/orders.  Current Outpatient Medications   Medication Sig Dispense Refill    acetaminophen 500 MG CAPS Take 2 capsules by mouth 3 times daily      atorvastatin (LIPITOR) 20 MG tablet Take 1 tablet by mouth at bedtime      calcium carbonate 500 mg, elemental, (OSCAL) 500 MG tablet Take 500 mg by mouth 2 times daily      citalopram (CELEXA) 10 MG tablet Take 1 tablet by mouth every morning      clopidogrel (PLAVIX) 75 MG tablet Take 75 mg by mouth daily      enoxaparin ANTICOAGULANT (LOVENOX) 40 MG/0.4ML syringe Inject 40 mg Subcutaneous daily DVT ppx      famotidine (PEPCID) 40 MG tablet Take 20 mg by mouth 2 times daily      furosemide (LASIX) 20 MG tablet Take 20 mg by mouth daily as needed (weight gain or leg swelling)      glimepiride (AMARYL) 1 MG tablet Take 2 mg by mouth every morning (before breakfast)      lisinopril (ZESTRIL) 5 MG tablet Take 5 mg by mouth at bedtime      magnesium oxide (MAG-OX) 400 MG tablet Take 400 mg by mouth daily      Multiple Vitamins-Minerals (CEROVITE SENIOR) TABS Take 1 tablet by mouth daily      nystatin (MYCOSTATIN) 980888 UNIT/GM external cream Apply topically 2 times daily      pioglitazone (ACTOS) 30 MG tablet Take 1 tablet by mouth every morning      polyethylene glycol (PEG 3350) 17 g packet Take 17 g by mouth daily       "senna (SENOKOT) 8.6 MG tablet Take 8.6 mg by mouth 2 times daily      vitamin D2 (ERGOCALCIFEROL) 32642 units (1250 mcg) capsule Take 50,000 Units by mouth once a week         ROS:  6 point ROS neg other than the symptoms noted above in the HPI.    PHYSICAL EXAM:  /58   Pulse 93   Temp 97.3  F (36.3  C)   Resp 22   Ht 1.615 m (5' 3.6\")   Wt 125.6 kg (277 lb)   SpO2 94%   BMI 48.15 kg/m    Gen: sitting up in bed, alert, cooperative and in no acute distress  Card: RRR, S1, S2, no murmurs  Resp: lungs clear to auscultation bilaterally anteriorly and laterally, no crackles or wheezes; moving good air  Ext: bilateral LE lymphedema with RLE in immobilizer in full extension   Neuro: CX II-XII grossly in tact; ROM in all four extremities grossly in tact  Psych: alert and oriented to self and general situation     LABORATORY/IMAGING DATA:  Reviewed as per [facility] and/or [facility]    ASSESSMENT/PLAN:    Comminuted Distal Right Femur Fracture (5/1/24) s/p IMN (5/2/24)  Secondary to a mechanical fall.   -- NWB to RLE  -- DVT ppx with enoxaparin until mobility improves   -- APAP 1000 mg TID,   -- Ca and D supplements  -- PT/OT  -- follow up with ortho as scheduled on 5/17/24    Traumatic R SDH (5/1/24)  Secondary to a fall. PTA clopidogrel held, bu has been resumed.   -- follow up in TBI clinic upon TCU discharge    R Alveolar Ridge Fracture  Multiple R Facial Contusions  Left Upper Eyelid Laceration  ENT recommended non-op management. Dentures broke and will need to be re-fit  -- analgesia as above     Acute Blood Loss Anemia  Secondary to above. No evidence of ongoing bleeding. Hgb 13s --> dominic 7.1 for which she received 2 units PRBCs.   -- repeat Hgb to ensure stability    Chronic Bilateral LE Lymphedema  Was on scheduled furosemide at home. Has RLE in full extension/immobilizer  -- PT to follow for lymphedema therapies     HTN  SBPs 120s-140s. PTA scheduled furosemide changed to PRN  -- lisinopril 5 mg " daily  -- follow BPs and adjust medications as needed    DM, Type II  Hgb A1c 6.9.   -- glimepiride 2 mg daily in the morning, pioglitazone 30 mg daily   -- follow sugars and adjust insulin as needed    Hx TIA (2019)  -- clopidogrel 75 mg daily     Mild Major Recurrent Depression  Mood and spirits OK today  -- citalopram 10 mg daily  -- supportive cares    CKD, Stage III  Baseline Cr <1.   -- avoid nephrotoxic meds  -- periodic BMP    GERD  -- famotidine 20 mg BID    Slow Transit Constipation  -- Miralax 17g daily and Senna 1 tab BID   -- adjust bowel regimen as needed    Fall  Physical Deconditioning  In setting of hospitalization and underlying medical conditions  -- ongoing PT/OT      Electronically signed by:  Anna Griffith MD

## 2024-05-14 NOTE — LETTER
5/14/2024        RE: Alana Sheikh  [address]  [place] [address]        [facility] GERIATRICS  INITIAL VISIT NOTE  May 14, 2024    PRIMARY CARE PROVIDER AND CLINIC:  No Ref-Primary, Physician No address on file    M [facility] Medical Record Number:  [id number]  Place of Service where encounter took place:  [facility] (TCU) [[id number]]    Chief Complaint   Patient presents with     Hospital F/U       HPI:    Alana Sheikh is a 84 year old  (1940) female seen today at [facility] TCU. Medical history is notable for HTN, DM II, CKD III, lymphedema and TIA. She was transferred from [facility] to [facility] where she was hospitalized  from 5/1/24 to 5/8/24 with multiple trauma after a mechanical fall:  comminuted distal R femur fracture, R tentorial SDH, R maxillary alveolar ridge fracture, R facial contusions, left eyebrow laceration and chronic L1 compression fracture. She is s/p IMN (5/2/24) for the femur fracture. ENT recommended non-op management of facial fractures. She was transfused 2 units PRBCs for dominic Hgb 7.1 in setting of above. PTA clopidogrel held due to SDH pending neurosurgery follow up. She was admitted to this facility for  rehab, medical management, and nursing care.      History obtained from: facility chart records, facility staff, patient report, [facility] chart review, and Care Everywhere [facility] chart review.      She was seen in the [facility] ER on 5/10/24 with nausea/emesis and abdominal pain. Initial concern for SBO; however, imaging without evidence of this and she was able to have a large BM in the ER and serial abdominal exams were reassuring Repeat CT head with resolution of SDH and PTA clopidogrel was resumed along with enoxaparin for DVT ppx.     Today, Ms. Sheikh is seen in her room sitting up in bed. Overall is doing OK - having a hard time believing how many things she broke with that fall. Pain is overall controlled. She wants to  be able to start walking on that leg. No chest pain. No dyspnea. No belly pain. Having BMs. The incentive spirometer is on her bedside table and she reports using it infrequently - encouraged her to use it 2-3 slow deep breaths at least TID. No acute concerns today per discussion with nursing. She is working with therapies.     CODE STATUS: DNR / DNI    ALLERGIES:  Allergies   Allergen Reactions     Metformin        PAST MEDICAL HISTORY:   HTN, DM II, CKD III, lymphedema and TIA    PAST SURGICAL HISTORY:   No past surgical history on file.    SOCIAL HISTORY:   Patient's living condition: lives in an assisted living facility    MEDICATIONS:  Post Discharge Medication Reconciliation Status: discharge medications reconciled and changed, per note/orders.  Current Outpatient Medications   Medication Sig Dispense Refill     acetaminophen 500 MG CAPS Take 2 capsules by mouth 3 times daily       atorvastatin (LIPITOR) 20 MG tablet Take 1 tablet by mouth at bedtime       calcium carbonate 500 mg, elemental, (OSCAL) 500 MG tablet Take 500 mg by mouth 2 times daily       citalopram (CELEXA) 10 MG tablet Take 1 tablet by mouth every morning       clopidogrel (PLAVIX) 75 MG tablet Take 75 mg by mouth daily       enoxaparin ANTICOAGULANT (LOVENOX) 40 MG/0.4ML syringe Inject 40 mg Subcutaneous daily DVT ppx       famotidine (PEPCID) 40 MG tablet Take 20 mg by mouth 2 times daily       furosemide (LASIX) 20 MG tablet Take 20 mg by mouth daily as needed (weight gain or leg swelling)       glimepiride (AMARYL) 1 MG tablet Take 2 mg by mouth every morning (before breakfast)       lisinopril (ZESTRIL) 5 MG tablet Take 5 mg by mouth at bedtime       magnesium oxide (MAG-OX) 400 MG tablet Take 400 mg by mouth daily       Multiple Vitamins-Minerals (CEROVITE SENIOR) TABS Take 1 tablet by mouth daily       nystatin (MYCOSTATIN) 694620 UNIT/GM external cream Apply topically 2 times daily       pioglitazone (ACTOS) 30 MG tablet Take 1 tablet  "by mouth every morning       polyethylene glycol (PEG 3350) 17 g packet Take 17 g by mouth daily       senna (SENOKOT) 8.6 MG tablet Take 8.6 mg by mouth 2 times daily       vitamin D2 (ERGOCALCIFEROL) 49941 units (1250 mcg) capsule Take 50,000 Units by mouth once a week         ROS:  6 point ROS neg other than the symptoms noted above in the HPI.    PHYSICAL EXAM:  /58   Pulse 93   Temp 97.3  F (36.3  C)   Resp 22   Ht 1.615 m (5' 3.6\")   Wt 125.6 kg (277 lb)   SpO2 94%   BMI 48.15 kg/m    Gen: sitting up in bed, alert, cooperative and in no acute distress  Card: RRR, S1, S2, no murmurs  Resp: lungs clear to auscultation bilaterally anteriorly and laterally, no crackles or wheezes; moving good air  Ext: bilateral LE lymphedema with RLE in immobilizer in full extension   Neuro: CX II-XII grossly in tact; ROM in all four extremities grossly in tact  Psych: alert and oriented to self and general situation     LABORATORY/IMAGING DATA:  Reviewed as per [facility] and/or [facility]    ASSESSMENT/PLAN:    Comminuted Distal Right Femur Fracture (5/1/24) s/p IMN (5/2/24)  Secondary to a mechanical fall.   -- NWB to RLE  -- DVT ppx with enoxaparin until mobility improves   -- APAP 1000 mg TID,   -- Ca and D supplements  -- PT/OT  -- follow up with ortho as scheduled on 5/17/24    Traumatic R SDH (5/1/24)  Secondary to a fall. PTA clopidogrel held, bu has been resumed.   -- follow up in TBI clinic upon TCU discharge    R Alveolar Ridge Fracture  Multiple R Facial Contusions  Left Upper Eyelid Laceration  ENT recommended non-op management. Dentures broke and will need to be re-fit  -- analgesia as above     Acute Blood Loss Anemia  Secondary to above. No evidence of ongoing bleeding. Hgb 13s --> dominic 7.1 for which she received 2 units PRBCs.   -- repeat Hgb to ensure stability    Chronic Bilateral LE Lymphedema  Was on scheduled furosemide at home. Has RLE in full extension/immobilizer  -- PT to follow for " lymphedema therapies     HTN  SBPs 120s-140s. PTA scheduled furosemide changed to PRN  -- lisinopril 5 mg daily  -- follow BPs and adjust medications as needed    DM, Type II  Hgb A1c 6.9.   -- glimepiride 2 mg daily in the morning, pioglitazone 30 mg daily   -- follow sugars and adjust insulin as needed    Hx TIA (2019)  -- clopidogrel 75 mg daily     Mild Major Recurrent Depression  Mood and spirits OK today  -- citalopram 10 mg daily  -- supportive cares    CKD, Stage III  Baseline Cr <1.   -- avoid nephrotoxic meds  -- periodic BMP    GERD  -- famotidine 20 mg BID    Slow Transit Constipation  -- Miralax 17g daily and Senna 1 tab BID   -- adjust bowel regimen as needed    Fall  Physical Deconditioning  In setting of hospitalization and underlying medical conditions  -- ongoing PT/OT      Electronically signed by:  Anna Griffith MD                          Sincerely,        Anna Griffith MD

## 2024-05-15 NOTE — PROGRESS NOTES
[facility] GERIATRICS    PRIMARY CARE PROVIDER AND CLINIC:  Physician No Ref-Primary, No address on file  Chief Complaint   Patient presents with    RECHECK      [facility] Medical Record Number:  [id number]  Place of Service where encounter took place:  [facility] (TCU) [[id number]]    Alana Sheikh  is a 84 year old  (1940), admitted to the above facility from  [facility]. Hospital stay 5/10/2024 through 5/11/2024..     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  Not on anticoagulation     On 5/1/2024, patient presented in the [facility] emergency room after mechanical fall resulting in facial trauma, SDH and a right femur fracture.  She was transferred to [facility].   She underwent IMN on 5/2/2024.  Anticoagulation was held due to head bleed and has follow-up with neurosurgery on 5/10/2024.    In 5/10/2024, patient was readmitted to the hospital concern of small bowel obstruction.  She did have large bowel movement in the emergency room.    Patient was seen with no concerns    CODE STATUS/ADVANCE DIRECTIVES DISCUSSION:  No Order  DNR / DNI  ALLERGIES:   Allergies   Allergen Reactions    Metformin       PAST MEDICAL HISTORY: No past medical history on file.   PAST SURGICAL HISTORY:   has no past surgical history on file.  FAMILY HISTORY: family history is not on file.  SOCIAL HISTORY:     Patient's living condition: lives in an assisted living facility    Post Discharge  "Medication Reconciliation Status:   MED REC REQUIRED  Post Medication Reconciliation Status:  Discharge medications reconciled and changed, see notes/orders       Current Outpatient Medications   Medication Sig Dispense Refill    acetaminophen 500 MG CAPS Take 2 capsules by mouth 3 times daily      atorvastatin (LIPITOR) 20 MG tablet Take 1 tablet by mouth at bedtime      calcium carbonate 500 mg, elemental, (OSCAL) 500 MG tablet Take 500 mg by mouth 2 times daily      citalopram (CELEXA) 10 MG tablet Take 1 tablet by mouth every morning      clopidogrel (PLAVIX) 75 MG tablet Take 75 mg by mouth daily      enoxaparin ANTICOAGULANT (LOVENOX) 40 MG/0.4ML syringe Inject 40 mg Subcutaneous daily DVT ppx      famotidine (PEPCID) 40 MG tablet Take 20 mg by mouth 2 times daily      furosemide (LASIX) 20 MG tablet Take 20 mg by mouth daily as needed (weight gain or leg swelling)      glimepiride (AMARYL) 1 MG tablet Take 2 mg by mouth every morning (before breakfast)      lisinopril (ZESTRIL) 5 MG tablet Take 5 mg by mouth at bedtime      magnesium oxide (MAG-OX) 400 MG tablet Take 400 mg by mouth daily      Multiple Vitamins-Minerals (CEROVITE SENIOR) TABS Take 1 tablet by mouth daily      nystatin (MYCOSTATIN) 574340 UNIT/GM external cream Apply topically 2 times daily      pioglitazone (ACTOS) 30 MG tablet Take 1 tablet by mouth every morning      polyethylene glycol (PEG 3350) 17 g packet Take 17 g by mouth daily      senna (SENOKOT) 8.6 MG tablet Take 8.6 mg by mouth 2 times daily      vitamin D2 (ERGOCALCIFEROL) 46149 units (1250 mcg) capsule Take 50,000 Units by mouth once a week       No current facility-administered medications for this visit.       ROS:  4 point ROS including Respiratory, CV, GI and , other than that noted in the HPI,  is negative    Vitals:  /58   Pulse 93   Temp 97.3  F (36.3  C)   Resp 22   Ht 1.615 m (5' 3.6\")   Wt 125.6 kg (277 lb)   SpO2 94%   BMI 48.15 kg/m    Exam:  GENERAL " APPEARANCE:  Alert, in no distress, morbidly obese  RESP:  lungs clear to auscultation , no respiratory distress  CV:  Palpation and auscultation of heart done , rate-normal  PSYCH:  oriented X 3    Lab/Diagnostic data:  Recent labs in [facility] reviewed by me today.     ASSESSMENT/PLAN:      (S72.575D) Closed fracture of distal end of right femur with routine healing, unspecified fracture morphology, subsequent encounter  (primary encounter diagnosis)  (Z47.89) Orthopedic aftercare  (M62.81) Generalized muscle weakness  (R53.81) Physical deconditioning  (Z91.81) Personal history of fall  (R26.9) Abnormal gait  (Z99.89) Uses walker  (R52) Pain  Comment:   On 5/1/2024, patient fell resulting in facial trauma, SDH and a right femur fracture.  She was transferred to [facility].   She underwent IMN on 5/2/2024.   DVT prophylaxis - Lovenox was restarted on 5/13  Plavix was restarted n 5/14   Pain Medications - has oxycodone prn and scheduled Tylenol 1000 mg TID   Bowel Medications - takes senna BID and miralax   Bone Health - takes calcium and Vitamin D  Weight bearing status - NWB RLE, no aggressive knee motion.    Incision - covered with CDI dressing  Follow up - 2 weeks with either [facility] ortho or  ortho NP/PA with xrays prior AP/ Pelvis, Ap/ Lateral of the hip   Follow up - 6 weeks with either [facility] ortho or [facility] NP/PA with  xrays prior AP/ Pelvis, Ap/ Lateral of the hip   Working with therapy   Plan:   [facility] Date & Time of Appointment: 5/17/24 @8:50 AM Address: [address] [place] [address],     (S06.5XAA) Subdural hematoma (H)  (S02.40CD) Closed fracture of right side of maxilla with routine healing, subsequent encounter  (S00.83XD) Contusion of other part of head, subsequent encounter  (S01.81XD) Facial laceration, subsequent encounter  (Z86.73) History of TIA (transient ischemic attack) and stroke  (Z86.718) Personal history of DVT (deep vein thrombosis)  Comment:   pt fell hit head was on Plavix at that time.    Per ENT, Fracture only minimally displaced and non-operative management was recommended with soft diet.   Pt received 2 unit PRBC in hospital  Lovenox was restarted on 5/13  Plavix was restarted n 5/14  Plan:   Needs to see TBI clinic was hospitalized when scheduled  Dental referral for new upper dentures     (S32.010D) Compression fracture of L1 vertebra with routine healing, subsequent encounter  Comment: chronic  Tylenol for pain   Plan: Continue with plan of care no changes at this time, adjustment as needed        (I10) Benign essential hypertension  Comment: chronic  Taking lisinopril  Plan: monitor BMP     (N18.31) Stage 3a chronic kidney disease (H)  Comment: chronic  Baseline Cr ~0.8, GFR >60   Personally reviewed her BMP from 5/10/2024 and creatinine was stable  Plan: avoid nephrotoxic medications       (E11.22,  N18.31,  Z79.4) Type 2 diabetes mellitus with stage 3a chronic kidney disease, with long-term current use of insulin (H)  (E66.01) Morbid obesity (H)  (E11.9,  Z79.84) Diabetes mellitus treated with oral medication (H)  Comment: chronic  HgbA1c 6.9% 5/3/24   Taking Actos and Amaryl   Plan: monitor blood sugars      (I89.0) Chronic acquired lymphedema  Comment: chronic  Taking levothyroxine.   Plan: monitor TSH yearly        (F32.A) Depression, unspecified depression type  Comment: chronic.   Taking citalopram  Plan: Continue with plan of care no changes at this time, adjustment as needed        (Z85.42) History of uterine cancer  Comment: hx of   Plan: Continue with plan of care no changes at this time, adjustment as needed     (K59.03) drug-induced constipation  Comment: Patient was hospitalized with potential small bowel obstruction did have bowel movement in the hospital.  Currently taking senna twice daily  Comment: Continue with plan of care no changes at this time, adjustment as needed      Electronically signed by:  CLIF Jade CNP

## 2024-05-16 VITALS
HEIGHT: 64 IN | RESPIRATION RATE: 20 BRPM | SYSTOLIC BLOOD PRESSURE: 111 MMHG | TEMPERATURE: 97.7 F | OXYGEN SATURATION: 93 % | WEIGHT: 277 LBS | HEART RATE: 81 BPM | BODY MASS INDEX: 47.29 KG/M2 | DIASTOLIC BLOOD PRESSURE: 53 MMHG

## 2024-05-17 ENCOUNTER — LAB REQUISITION (OUTPATIENT)
Dept: LAB | Facility: CLINIC | Age: 84
End: 2024-05-17
Payer: MEDICARE

## 2024-05-17 ENCOUNTER — TRANSITIONAL CARE UNIT VISIT (OUTPATIENT)
Dept: GERIATRICS | Facility: CLINIC | Age: 84
End: 2024-05-17
Payer: MEDICARE

## 2024-05-17 DIAGNOSIS — Z47.89 ORTHOPEDIC AFTERCARE: ICD-10-CM

## 2024-05-17 DIAGNOSIS — R53.81 PHYSICAL DECONDITIONING: ICD-10-CM

## 2024-05-17 DIAGNOSIS — Z91.81 PERSONAL HISTORY OF FALL: ICD-10-CM

## 2024-05-17 DIAGNOSIS — N18.30 CHRONIC KIDNEY DISEASE, STAGE 3 UNSPECIFIED (H): ICD-10-CM

## 2024-05-17 DIAGNOSIS — S72.401D CLOSED FRACTURE OF DISTAL END OF RIGHT FEMUR WITH ROUTINE HEALING, UNSPECIFIED FRACTURE MORPHOLOGY, SUBSEQUENT ENCOUNTER: Primary | ICD-10-CM

## 2024-05-17 DIAGNOSIS — M62.81 GENERALIZED MUSCLE WEAKNESS: ICD-10-CM

## 2024-05-17 PROCEDURE — 99309 SBSQ NF CARE MODERATE MDM 30: CPT | Performed by: NURSE PRACTITIONER

## 2024-05-17 NOTE — PROGRESS NOTES
Alana Sheikh  1940    Orders  BMP and CBC next lab day    Althea Crowder, APRN CNP on 5/17/2024 at 3:16 PM

## 2024-05-17 NOTE — LETTER
"    5/17/2024        RE: Alana Sheikh  [address]  [place] [address]        [facility] GERIATRICS    Chief Complaint   Patient presents with     RECHECK     HPI:  Alana Sheikh is a 84 year old  (1940), who is being seen today for an episodic care visit at: [facility] ([facility]) [[id number]].      Dressing was taken off today  Stitches intact    Allergies, and PMH/PSH reviewed in [facility] today.  REVIEW OF SYSTEMS:  4 point ROS including Respiratory, CV, GI and , other than that noted in the HPI,  is negative    Objective:   /53   Pulse 81   Temp 97.7  F (36.5  C)   Resp 20   Ht 1.615 m (5' 3.6\")   Wt 125.6 kg (277 lb)   SpO2 93%   BMI 48.15 kg/m    GENERAL APPEARANCE:  Alert, in no distress, morbidly obese  RESP:  lungs clear to auscultation , no respiratory distress  CV:  Palpation and auscultation of heart done , rate-normal  SKIN:  incision intact with stitches  PSYCH:  oriented X 3    Recent labs in [facility] reviewed by me today.           Assessment/Plan:    (S72.401D) Closed fracture of distal end of right femur with routine healing, unspecified fracture morphology, subsequent encounter  (primary encounter diagnosis)  (Z47.89) Orthopedic aftercare  (M62.81) Generalized muscle weakness  (R53.81) Physical deconditioning  (Z91.81) Personal history of fall  Comment:   On 5/1/2024, patient fell resulting in facial trauma, SDH and a right femur fracture.  She was transferred to [facility].   She underwent IMN on 5/2/2024.   DVT prophylaxis - taking Plavix and lovenox.    Pain Medications - scheduled Tylenol 1000 mg TID   Bowel Medications - takes senna BID and miralax   Bone Health - takes calcium and Vitamin D  Weight bearing status - NWB RLE, no aggressive knee motion.    Incision - incisions are CDI.  See photos  Follow up - ordered AP/ Pelvis, Ap/ Lateral of the hip and AP lateral Knee  Follow up - 6 weeks with either [facility] ortho or  ortho NP/PA with  xrays prior AP/ Pelvis, Ap/ " Lateral of the hip   Working with therapy  Plan: BMP and CBC next lab day  Therapy to evaluate and treat  [facility] Date & Time of Appointment: 5/17/24 @8:50 AM Address: [address] [place] [address] REQUIRED  Post Medication Reconciliation Status:  Medication reconciliation previously completed during another office visit      Electronically signed by:     CLIF Jade CNP on 5/17/2024 at 3:13 PM       Alana Sheikh  1940    Orders  BMP and CBC next lab day    CLIF Jade CNP on 5/17/2024 at 3:16 PM      Sincerely,        CLIF Jade CNP

## 2024-05-17 NOTE — PROGRESS NOTES
"[facility] GERIATRICS    Chief Complaint   Patient presents with    RECHECK     HPI:  Alana Sheikh is a 84 year old  (1940), who is being seen today for an episodic care visit at: [facility] ([facility]) [[id number]].      Dressing was taken off today  Stitches intact    Allergies, and PMH/PSH reviewed in EPIC today.  REVIEW OF SYSTEMS:  4 point ROS including Respiratory, CV, GI and , other than that noted in the HPI,  is negative    Objective:   /53   Pulse 81   Temp 97.7  F (36.5  C)   Resp 20   Ht 1.615 m (5' 3.6\")   Wt 125.6 kg (277 lb)   SpO2 93%   BMI 48.15 kg/m    GENERAL APPEARANCE:  Alert, in no distress, morbidly obese  RESP:  lungs clear to auscultation , no respiratory distress  CV:  Palpation and auscultation of heart done , rate-normal  SKIN:  incision intact with stitches  PSYCH:  oriented X 3    Recent labs in [facility] reviewed by me today.           Assessment/Plan:    (S72.401D) Closed fracture of distal end of right femur with routine healing, unspecified fracture morphology, subsequent encounter  (primary encounter diagnosis)  (Z47.89) Orthopedic aftercare  (M62.81) Generalized muscle weakness  (R53.81) Physical deconditioning  (Z91.81) Personal history of fall  Comment:   On 5/1/2024, patient fell resulting in facial trauma, SDH and a right femur fracture.  She was transferred to [facility].   She underwent IMN on 5/2/2024.   DVT prophylaxis - taking Plavix and lovenox.    Pain Medications - scheduled Tylenol 1000 mg TID   Bowel Medications - takes senna BID and miralax   Bone Health - takes calcium and Vitamin D  Weight bearing status - NWB RLE, no aggressive knee motion.    Incision - incisions are CDI.  See photos  Follow up - ordered AP/ Pelvis, Ap/ Lateral of the hip and AP lateral Knee  Follow up - 6 weeks with either [facility] ortho or  ortho NP/PA with  xrays prior AP/ Pelvis, Ap/ Lateral of the hip   Working with therapy  Plan: BMP and CBC next lab day  Therapy to " evaluate and treat  [facility] Date & Time of Appointment: 5/17/24 @8:50 AM Address: [address] [place] [address] REQUIRED  Post Medication Reconciliation Status:  Medication reconciliation previously completed during another office visit      Electronically signed by:     CLIF Jade CNP on 5/17/2024 at 3:13 PM

## 2024-05-20 LAB
ANION GAP SERPL CALCULATED.3IONS-SCNC: 9 MMOL/L (ref 7–15)
BUN SERPL-MCNC: 23.2 MG/DL (ref 8–23)
CALCIUM SERPL-MCNC: 9.2 MG/DL (ref 8.8–10.2)
CHLORIDE SERPL-SCNC: 106 MMOL/L (ref 98–107)
CREAT SERPL-MCNC: 0.74 MG/DL (ref 0.51–0.95)
DEPRECATED HCO3 PLAS-SCNC: 27 MMOL/L (ref 22–29)
EGFRCR SERPLBLD CKD-EPI 2021: 80 ML/MIN/1.73M2
ERYTHROCYTE [DISTWIDTH] IN BLOOD BY AUTOMATED COUNT: 17.4 % (ref 10–15)
GLUCOSE SERPL-MCNC: 139 MG/DL (ref 70–99)
HCT VFR BLD AUTO: 34.2 % (ref 35–47)
HGB BLD-MCNC: 10.1 G/DL (ref 11.7–15.7)
MCH RBC QN AUTO: 29.8 PG (ref 26.5–33)
MCHC RBC AUTO-ENTMCNC: 29.5 G/DL (ref 31.5–36.5)
MCV RBC AUTO: 101 FL (ref 78–100)
PLATELET # BLD AUTO: 199 10E3/UL (ref 150–450)
POTASSIUM SERPL-SCNC: 4.3 MMOL/L (ref 3.4–5.3)
RBC # BLD AUTO: 3.39 10E6/UL (ref 3.8–5.2)
SODIUM SERPL-SCNC: 142 MMOL/L (ref 135–145)
WBC # BLD AUTO: 4.5 10E3/UL (ref 4–11)

## 2024-05-20 PROCEDURE — 80048 BASIC METABOLIC PNL TOTAL CA: CPT | Performed by: NURSE PRACTITIONER

## 2024-05-20 PROCEDURE — 36415 COLL VENOUS BLD VENIPUNCTURE: CPT | Performed by: NURSE PRACTITIONER

## 2024-05-20 PROCEDURE — 85027 COMPLETE CBC AUTOMATED: CPT | Performed by: NURSE PRACTITIONER

## 2024-05-20 PROCEDURE — P9604 ONE-WAY ALLOW PRORATED TRIP: HCPCS | Performed by: NURSE PRACTITIONER

## 2024-05-20 NOTE — PROGRESS NOTES
"[facility] GERIATRICS    Chief Complaint   Patient presents with    RECHECK     HPI:  Alana Sheikh is a 84 year old  (1940), who is being seen today for an episodic care visit at: [facility] ([facility]) [[id number]].    Ortho coming on 05/22/24  Pt has no complaints   Has difficulty hearing  Per nursing,  Pt requires MAX AX2 with toileting, bathing, lower/ upper body dressing, transfer, grooming/oral care, bed mobility. Pt is independent with feeding after set-up.    Allergies, and PMH/PSH reviewed in [facility] today.  REVIEW OF SYSTEMS:  4 point ROS including Respiratory, CV, GI and , other than that noted in the HPI,  is negative    Objective:   /62   Pulse 84   Temp 97.5  F (36.4  C)   Resp 20   Ht 1.615 m (5' 3.6\")   Wt 111.4 kg (245 lb 8 oz)   SpO2 95%   BMI 42.67 kg/m    GENERAL APPEARANCE:  Alert, in no distress, morbidly obese  RESP:  lungs clear to auscultation , no respiratory distress  CV:  Palpation and auscultation of heart done , rate-normal  SKIN:  incision intact with stitches  PSYCH:  oriented X 3    Most Recent 3 CBC's:  Recent Labs   Lab Test 05/20/24  0619   WBC 4.5   HGB 10.1*   *        Most Recent 3 BMP's:  Recent Labs   Lab Test 05/20/24  0619      POTASSIUM 4.3   CHLORIDE 106   CO2 27   BUN 23.2*   CR 0.74   ANIONGAP 9   VALERIY 9.2   *           Assessment/Plan:    (S72.333O) Closed fracture of distal end of right femur with routine healing, unspecified fracture morphology, subsequent encounter  (primary encounter diagnosis)  (Z47.89) Orthopedic aftercare  (M62.81) Generalized muscle weakness  (R53.81) Physical deconditioning  (Z91.81) Personal history of fall  Comment:   On 5/1/2024, patient fell resulting in facial trauma, SDH and a right femur fracture.  She was transferred to [facility].   She underwent IMN on 5/2/2024.   DVT prophylaxis - taking Plavix and lovenox.    Pain Medications - scheduled Tylenol 1000 mg TID   Bowel Medications - " takes senna BID and miralax   Bone Health - takes calcium and Vitamin D  Weight bearing status - NWB RLE, no aggressive knee motion.    Incision - incisions are covered with CDI dressing  Follow up - completed AP/ Pelvis, Ap/ Lateral of the hip and AP lateral Knee  Follow [facility] - [facility] or  ortho NP/PA with  xrays prior AP/ Pelvis, Ap/ Lateral of the hip   Working with therapy  BMP and CBC from 5/20 are stable.   Plan: [facility]opedic coming to take out stitches and see pt on 05/22/24       MED REC REQUIRED  Post Medication Reconciliation Status:  Medication reconciliation previously completed during another office visit      Electronically signed by:     CLIF Jade CNP

## 2024-05-21 ENCOUNTER — TRANSITIONAL CARE UNIT VISIT (OUTPATIENT)
Dept: GERIATRICS | Facility: CLINIC | Age: 84
End: 2024-05-21
Payer: MEDICARE

## 2024-05-21 VITALS
BODY MASS INDEX: 41.91 KG/M2 | RESPIRATION RATE: 20 BRPM | SYSTOLIC BLOOD PRESSURE: 135 MMHG | HEIGHT: 64 IN | WEIGHT: 245.5 LBS | OXYGEN SATURATION: 95 % | TEMPERATURE: 97.5 F | DIASTOLIC BLOOD PRESSURE: 62 MMHG | HEART RATE: 84 BPM

## 2024-05-21 DIAGNOSIS — Z91.81 PERSONAL HISTORY OF FALL: ICD-10-CM

## 2024-05-21 DIAGNOSIS — S72.401D CLOSED FRACTURE OF DISTAL END OF RIGHT FEMUR WITH ROUTINE HEALING, UNSPECIFIED FRACTURE MORPHOLOGY, SUBSEQUENT ENCOUNTER: Primary | ICD-10-CM

## 2024-05-21 DIAGNOSIS — Z47.89 ORTHOPEDIC AFTERCARE: ICD-10-CM

## 2024-05-21 DIAGNOSIS — M62.81 GENERALIZED MUSCLE WEAKNESS: ICD-10-CM

## 2024-05-21 DIAGNOSIS — R53.81 PHYSICAL DECONDITIONING: ICD-10-CM

## 2024-05-21 PROCEDURE — 99309 SBSQ NF CARE MODERATE MDM 30: CPT | Performed by: NURSE PRACTITIONER

## 2024-05-21 NOTE — LETTER
"    5/21/2024        RE: Alana Sheikh  [address]  [place] [address]        [facility] GERIATRICS    Chief Complaint   Patient presents with     RECHECK     HPI:  Alana Sheikh is a 84 year old  (1940), who is being seen today for an episodic care visit at: [facility] ([facility]) [[id number]].    Ortho coming on 05/22/24  Pt has no complaints   Has difficulty hearing  Per nursing,  Pt requires MAX AX2 with toileting, bathing, lower/ upper body dressing, transfer, grooming/oral care, bed mobility. Pt is independent with feeding after set-up.    Allergies, and PMH/PSH reviewed in [facility] today.  REVIEW OF SYSTEMS:  4 point ROS including Respiratory, CV, GI and , other than that noted in the HPI,  is negative    Objective:   /62   Pulse 84   Temp 97.5  F (36.4  C)   Resp 20   Ht 1.615 m (5' 3.6\")   Wt 111.4 kg (245 lb 8 oz)   SpO2 95%   BMI 42.67 kg/m    GENERAL APPEARANCE:  Alert, in no distress, morbidly obese  RESP:  lungs clear to auscultation , no respiratory distress  CV:  Palpation and auscultation of heart done , rate-normal  SKIN:  incision intact with stitches  PSYCH:  oriented X 3    Most Recent 3 CBC's:  Recent Labs   Lab Test 05/20/24  0619   WBC 4.5   HGB 10.1*   *        Most Recent 3 BMP's:  Recent Labs   Lab Test 05/20/24  0619      POTASSIUM 4.3   CHLORIDE 106   CO2 27   BUN 23.2*   CR 0.74   ANIONGAP 9   VALERIY 9.2   *           Assessment/Plan:    (S72.401D) Closed fracture of distal end of right femur with routine healing, unspecified fracture morphology, subsequent encounter  (primary encounter diagnosis)  (Z47.89) Orthopedic aftercare  (M62.81) Generalized muscle weakness  (R53.81) Physical deconditioning  (Z91.81) Personal history of fall  Comment:   On 5/1/2024, patient fell resulting in facial trauma, SDH and a right femur fracture.  She was [place] [facility]   She underwent IMN on 5/2/2024.   DVT prophylaxis - taking Plavix and " lovenox.    Pain Medications - scheduled Tylenol 1000 mg TID   Bowel Medications - takes senna BID and miralax   Bone Health - takes calcium and Vitamin D  Weight bearing status - NWB RLE, no aggressive knee motion.    Incision - incisions are covered with CDI dressing  Follow up - completed AP/ Pelvis, Ap/ Lateral of the hip and AP lateral Knee  Follow up - 6 weeks with either [facility] ortho or  ortho NP/PA with  xrays prior AP/ Pelvis, Ap/ Lateral of the hip   Working with therapy  BMP and CBC from 5/20 are stable.   Plan: [facility] coming to take out stitches and see pt on 05/22/24       MED REC REQUIRED  Post Medication Reconciliation Status:  Medication reconciliation previously completed during another office visit      Electronically signed by:     CLIF Jade CNP            Sincerely,        CLIF Jade CNP

## 2024-05-22 ENCOUNTER — TRANSITIONAL CARE UNIT VISIT (OUTPATIENT)
Dept: GERIATRICS | Facility: CLINIC | Age: 84
End: 2024-05-22
Payer: MEDICARE

## 2024-05-22 VITALS
SYSTOLIC BLOOD PRESSURE: 135 MMHG | HEART RATE: 84 BPM | RESPIRATION RATE: 20 BRPM | OXYGEN SATURATION: 95 % | TEMPERATURE: 97.5 F | DIASTOLIC BLOOD PRESSURE: 62 MMHG

## 2024-05-22 DIAGNOSIS — S72.401D CLOSED FRACTURE OF DISTAL END OF RIGHT FEMUR WITH ROUTINE HEALING, UNSPECIFIED FRACTURE MORPHOLOGY, SUBSEQUENT ENCOUNTER: Primary | ICD-10-CM

## 2024-05-22 DIAGNOSIS — Z47.89 ORTHOPEDIC AFTERCARE: ICD-10-CM

## 2024-05-22 PROBLEM — E04.2 NON-TOXIC MULTINODULAR GOITER: Status: ACTIVE | Noted: 2017-12-25

## 2024-05-22 PROBLEM — R60.9 EDEMA, UNSPECIFIED TYPE: Status: ACTIVE | Noted: 2024-04-11

## 2024-05-22 PROBLEM — N18.31 TYPE 2 DIABETES MELLITUS WITH STAGE 3A CHRONIC KIDNEY DISEASE, WITHOUT LONG-TERM CURRENT USE OF INSULIN (H): Status: ACTIVE | Noted: 2024-01-04

## 2024-05-22 PROBLEM — E11.22 TYPE 2 DIABETES MELLITUS WITH STAGE 3A CHRONIC KIDNEY DISEASE, WITHOUT LONG-TERM CURRENT USE OF INSULIN (H): Status: ACTIVE | Noted: 2024-01-04

## 2024-05-22 PROBLEM — R11.2 NAUSEA AND VOMITING IN ADULT: Status: ACTIVE | Noted: 2019-04-08

## 2024-05-22 PROBLEM — F33.40 RECURRENT MAJOR DEPRESSIVE DISORDER, IN REMISSION (H): Status: ACTIVE | Noted: 2017-03-24

## 2024-05-22 PROBLEM — E83.42 HYPOMAGNESEMIA: Status: ACTIVE | Noted: 2018-12-01

## 2024-05-22 PROBLEM — S62.101A RIGHT WRIST FRACTURE: Status: ACTIVE | Noted: 2019-04-03

## 2024-05-22 PROBLEM — E11.22 CHRONIC KIDNEY DISEASE DUE TO TYPE 2 DIABETES MELLITUS (H): Status: ACTIVE | Noted: 2017-12-27

## 2024-05-22 PROBLEM — E11.9 DIABETES MELLITUS TYPE 2, CONTROLLED, WITHOUT COMPLICATIONS (H): Status: ACTIVE | Noted: 2024-05-22

## 2024-05-22 PROBLEM — Z87.442 HISTORY OF RENAL CALCULI: Status: ACTIVE | Noted: 2017-08-24

## 2024-05-22 PROBLEM — B35.4 TINEA CORPORIS: Status: ACTIVE | Noted: 2017-12-25

## 2024-05-22 PROBLEM — E11.3299 MILD NONPROLIFERATIVE DIABETIC RETINOPATHY ASSOCIATED WITH TYPE 2 DIABETES MELLITUS (H): Status: ACTIVE | Noted: 2017-12-27

## 2024-05-22 PROBLEM — R79.89 ELEVATED LACTIC ACID LEVEL: Status: ACTIVE | Noted: 2019-04-08

## 2024-05-22 PROBLEM — E66.01 MORBID OBESITY (H): Status: ACTIVE | Noted: 2017-03-24

## 2024-05-22 PROBLEM — I10 HTN (HYPERTENSION): Status: ACTIVE | Noted: 2024-04-18

## 2024-05-22 PROBLEM — F32.A DEPRESSION: Status: ACTIVE | Noted: 2024-04-18

## 2024-05-22 PROBLEM — E55.9 VITAMIN D DEFICIENCY: Status: ACTIVE | Noted: 2024-05-03

## 2024-05-22 PROBLEM — Z86.718 HISTORY OF DEEP VENOUS THROMBOSIS: Status: ACTIVE | Noted: 2017-12-25

## 2024-05-22 PROBLEM — S01.81XA FACIAL LACERATION, INITIAL ENCOUNTER: Status: ACTIVE | Noted: 2024-05-01

## 2024-05-22 PROBLEM — M15.9 GENERALIZED OSTEOARTHRITIS: Status: ACTIVE | Noted: 2017-03-24
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
## 2024-05-22 PROBLEM — S06.5XAA SDH (SUBDURAL HEMATOMA) (H): Status: ACTIVE | Noted: 2024-05-01

## 2024-05-22 PROBLEM — S06.9XAA: Status: ACTIVE | Noted: 2024-05-01

## 2024-05-22 PROBLEM — E78.5 DYSLIPIDEMIA: Status: ACTIVE | Noted: 2024-04-18

## 2024-05-22 PROCEDURE — 99308 SBSQ NF CARE LOW MDM 20: CPT | Performed by: NURSE PRACTITIONER

## 2024-05-22 NOTE — LETTER
5/22/2024        RE: Alana Sheikh  [address]  [place] [address]        [facility] GERIATRICS    Chief Complaint   Patient presents with     RECHECK     HPI:  Alana Sheikh is a 84 year old  (1940), who is being seen today for an episodic care visit at: [facility] ([facility]) [[id number]].     Today's Visit:  Alana Sheikh is seen for ortho postop visit.  She had a mechanical fall at her AL in [facility] 5/1 resulting in multiple injuries including comminuted right intra-articular distal femur fracture.  Underwent IMN 5/2/24 performed by Dr. Bea Barber at [facility].  Made NWB to RLE postoperatively, in knee immobilizer with no aggressive knee motion, lovenox for DVT prophylaxis.  [facility]charged to [facility].  Was to follow-up with [facility] ortho but requested on-site visit if possible due to cost of transportation and difficulty mobilizing with NWB status.     Alana is seen in her room, finishing breakfast.  Reports that her RLE has been minimally painful.  Will occasionally have an aching sensation but nothing severe.  Managing pain with acetaminophen 1000mg TID, no narcotics.  Compliant with the knee immobilizer although it does slide down her leg due to leg size.  Has not been bending the knee but notes it sometimes bends slightly with transfers when the immobilizer is too low.  Remains on lovenox.  Chronic lower extremity edema issues, on furosemide, does not feel it has worsened since surgery.  Incisions remain covered with island dressings.  No plans for discharge at this time, she is hopeful to return to her AL in [facility].  Has a supportive nephew who lives in [facility] [place] [facility] but will be leaving for [place] 1 month beginning of June.     Denies fever, chills, shortness of breath.  Having bowel movements.  Fair appetite.      Allergies, and PMH/PSH reviewed in [facility] today.  REVIEW OF SYSTEMS:  Pertinent positives/negatives as noted above.    Objective:   /62   Pulse 84    Temp 97.5  F (36.4  C)   Resp 20   SpO2 95%   GENERAL APPEARANCE:  Alert, in no distress, morbidly obese, cooperative  M/S:   RLE:  Edema to bilateral lower extremities.  9 small incisions, covered by island dressings.  Dressings removed, no signs of drainage.  Incisions without active drainage.  Trace ecchymosis to lateral leg. Sutures removed, incisions remain well-approximated. Unable to complete SLR on R.  Intact sensation.  Able to dorsiflex/plantarflex.  No calf tenderness.       PSYCH:  oriented X 3        Assessment/Plan:  (S72.949D) Closed fracture of distal end of right femur with routine healing, unspecified fracture morphology, subsequent encounter  (primary encounter diagnosis)  (Z47.89) Orthopedic aftercare  POD 20 s/p IMN distal R femur fracture.   Pain well controlled with acetaminophen.  Compliant with NWB.  Using knee immobilizer although slides down.  Writer discussed with Alana where the immobilizer should sit on the leg and adjusted it, marked where it should be sitting on her leg and explained this to nursing as well.  Explained the ongoing need for NWB to allow for healing as well as use of knee immobilizer ongoing.  She verbalizes understanding of where the brace should sit and states she will highlight the marks to nursing/therapy to adjust it if it slides down.  Will need to follow-up with [facility] ortho for next visit at six weeks post-op.  Images pushed to [facility] imaging system and writer left message for surgical team with update from today's visit.    Plan:   -Continue NWB to RLE  -Immobilizer on when transferring out of bed, otherwise OK to leave off  -Limit R knee flexion  -Keep knee incisions covered w/ either island dressing or ACE wrap to prevent rubbing on incisions from brace  -OK for incisions to get wet but do not scrub, soak, or submerge until end of June  -Recheck w/ [facility] ortho at 6-weeks post-op (week of Guerita 10).  [facility] to call to schedule:  [phone]    Electronically signed  by: CLIF Hodler CNP         Sincerely,        Roxi Montesinos, CLIF CNP

## 2024-05-22 NOTE — LETTER
5/22/2024        RE: Alana Sheikh  [address]  [place] [address]        [facility]    Chief Complaint   Patient presents with     RECHECK     HPI:  Alana Sheikh is a 84 year old  (1940), who is being seen today for an episodic care visit at: [facility] ([facility]) [[id number]].     Today's Visit:  Alana Sheikh is seen for ortho postop visit.  She had a mechanical fall at her AL in [facility] 5/1 resulting in multiple injuries including comminuted right intra-articular distal femur fracture.  Underwent IMN 5/2/24 performed by Dr. Bea Barber at [facility].  Made NWB to RLE postoperatively, in knee immobilizer with no aggressive knee motion, lovenox for DVT prophylaxis.  Discharged to [facility].  Was to follow-up with [facility] ortho but requested on-site visit if possible due to cost of transportation and difficulty mobilizing with NWB status.     Alana is seen in her room, finishing breakfast.  Reports that her RLE has been minimally painful.  Will occasionally have an aching sensation but nothing severe.  Managing pain with acetaminophen 1000mg TID, no narcotics.  Compliant with the knee immobilizer although it does slide down her leg due to leg size.  Has not been bending the knee but notes it sometimes bends slightly with transfers when the immobilizer is too low.  Remains on lovenox.  Chronic lower extremity edema issues, on furosemide, does not feel it has worsened since surgery.  Incisions remain covered with island dressings.  No plans for discharge at this time, she is hopeful to return to her AL in [place] [facility].  Has a supportive nephew who lives in [facility] [facility] but will be leaving for [place] x 1 month beginning of June.     Denies fever, chills, shortness of breath.  Having bowel movements.  Fair appetite.      Allergies, and PMH/PSH reviewed in EPIC today.  REVIEW OF SYSTEMS:  Pertinent positives/negatives as noted above.    Objective:   /62   Pulse 84    Temp 97.5  F (36.4  C)   Resp 20   SpO2 95%   GENERAL APPEARANCE:  Alert, in no distress, morbidly obese, cooperative  M/S:   RLE:  Edema to bilateral lower extremities.  9 small incisions, covered by island dressings.  Dressings removed, no signs of drainage.  Incisions without active drainage.  Trace ecchymosis to lateral leg. Sutures removed, incisions remain well-approximated. Unable to complete SLR on R.  Intact sensation.  Able to dorsiflex/plantarflex.  No calf tenderness.       PSYCH:  oriented X 3        Assessment/Plan:  (S72.374D) Closed fracture of distal end of right femur with routine healing, unspecified fracture morphology, subsequent encounter  (primary encounter diagnosis)  (Z47.89) Orthopedic aftercare  POD 20 s/p IMN distal R femur fracture.   Pain well controlled with acetaminophen.  Compliant with NWB.  Using knee immobilizer although slides down.  Writer discussed with Alana where the immobilizer should sit on the leg and adjusted it, marked where it should be sitting on her leg and explained this to nursing as well.  Explained the ongoing need for NWB to allow for healing as well as use of knee immobilizer ongoing.  She verbalizes understanding of where the brace should sit and states she will highlight the marks to nursing/therapy to adjust it if it slides down.  Will need to follow-up with [facility] ortho for next visit at six weeks post-op.  Images pushed to [facility] imaging system and writer left message for surgical team with update from today's visit.    Plan:   -Continue NWB to RLE  -Immobilizer on when transferring out of bed, otherwise OK to leave off  -Limit R knee flexion  -Keep knee incisions covered w/ either island dressing or ACE wrap to prevent rubbing on incisions from brace  -OK for incisions to get wet but do not scrub, soak, or submerge until end of June  -Recheck w/ [facility] ortho at 6-weeks post-op (week of Guerita 10).  [facility] to call to schedule:  [phone]    Electronically signed  by: CLIF Holder CNP         Sincerely,        Roxi Montesinos, CLIF CNP

## 2024-05-23 NOTE — PROGRESS NOTES
[facility]    Chief Complaint   Patient presents with    RECHECK     HPI:  Alana Sheikh is a 84 year old  (1940), who is being seen today for an episodic care visit at: [facility] ([facility]) [[id number]].     Today's Visit:  Alana Sheikh is seen for ortho postop visit.  She had a mechanical fall at her AL in [facility] 5/1 resulting in multiple injuries including comminuted right intra-articular distal femur fracture.  Underwent IMN 5/2/24 performed by Dr. Bea Barber at [facility].  Made NWB to RLE postoperatively, in knee immobilizer with no aggressive knee motion, lovenox for DVT prophylaxis.  Discharged to [facility].  Was to follow-up with [facility] ortho but requested on-site visit if possible due to cost of transportation and difficulty mobilizing with NWB status.     Alana is seen in her room, finishing breakfast.  Reports that her RLE has been minimally painful.  Will occasionally have an aching sensation but nothing severe.  Managing pain with acetaminophen 1000mg TID, no narcotics.  Compliant with the knee immobilizer although it does slide down her leg due to leg size.  Has not been bending the knee but notes it sometimes bends slightly with transfers when the immobilizer is too low.  Remains on lovenox.  Chronic lower extremity edema issues, on furosemide, does not feel it has worsened since surgery.  Incisions remain covered with island dressings.  No plans for discharge at this time, she is hopeful to return to her AL in [facility].  Has a supportive nephew who lives in [facility] [facility] but will be leaving for [place] x 1 month beginning of June.     Denies fever, chills, shortness of breath.  Having bowel movements.  Fair appetite.      Allergies, and PMH/PSH reviewed in [employer] today.  REVIEW OF SYSTEMS:  Pertinent positives/negatives as noted above.    Objective:   /62   Pulse 84   Temp 97.5  F (36.4  C)   Resp 20   SpO2 95%   GENERAL APPEARANCE:  Alert, in no  distress, morbidly obese, cooperative  M/S:   RLE:  Edema to bilateral lower extremities.  9 small incisions, covered by island dressings.  Dressings removed, no signs of drainage.  Incisions without active drainage.  Trace ecchymosis to lateral leg. Sutures removed, incisions remain well-approximated. Unable to complete SLR on R.  Intact sensation.  Able to dorsiflex/plantarflex.  No calf tenderness.       PSYCH:  oriented X 3        Assessment/Plan:  (G02.483M) Closed fracture of distal end of right femur with routine healing, unspecified fracture morphology, subsequent encounter  (primary encounter diagnosis)  (Z47.89) Orthopedic aftercare  POD 20 s/p IMN distal R femur fracture.   Pain well controlled with acetaminophen.  Compliant with NWB.  Using knee immobilizer although slides down.  Writer discussed with Alana where the immobilizer should sit on the leg and adjusted it, marked where it should be sitting on her leg and explained this to nursing as well.  Explained the ongoing need for NWB to allow for healing as well as use of knee immobilizer ongoing.  She verbalizes understanding of where the brace should sit and states she will highlight the marks to nursing/therapy to adjust it if it slides down.  Will need to follow-up with [facility] ortho for next visit at six weeks post-op.  Images pushed to [facility] imaging system and writer left message for surgical team with update from today's visit.    Plan:   -Continue NWB to RLE  -Immobilizer on when transferring out of bed, otherwise OK to leave off  -Limit R knee flexion  -Keep knee incisions covered w/ either island dressing or ACE wrap to prevent rubbing on incisions from brace  -OK for incisions to get wet but do not scrub, soak, or submerge until end of June  -Recheck w/ [facility] ortho at 6-weeks post-op (week of Guerita 10).  [facility] to call to schedule:  [phone]    Electronically signed by: CLIF Holder CNP

## 2024-05-24 ENCOUNTER — TRANSITIONAL CARE UNIT VISIT (OUTPATIENT)
Dept: GERIATRICS | Facility: CLINIC | Age: 84
End: 2024-05-24
Payer: MEDICARE

## 2024-05-24 VITALS
TEMPERATURE: 97.9 F | DIASTOLIC BLOOD PRESSURE: 65 MMHG | BODY MASS INDEX: 44.64 KG/M2 | WEIGHT: 261.5 LBS | OXYGEN SATURATION: 93 % | RESPIRATION RATE: 20 BRPM | HEART RATE: 87 BPM | SYSTOLIC BLOOD PRESSURE: 137 MMHG | HEIGHT: 64 IN

## 2024-05-24 DIAGNOSIS — N18.31 STAGE 3A CHRONIC KIDNEY DISEASE (H): ICD-10-CM

## 2024-05-24 DIAGNOSIS — S06.5XAA SDH (SUBDURAL HEMATOMA) (H): ICD-10-CM

## 2024-05-24 DIAGNOSIS — Z47.89 ORTHOPEDIC AFTERCARE: ICD-10-CM

## 2024-05-24 DIAGNOSIS — S72.401D CLOSED FRACTURE OF DISTAL END OF RIGHT FEMUR WITH ROUTINE HEALING, UNSPECIFIED FRACTURE MORPHOLOGY, SUBSEQUENT ENCOUNTER: Primary | ICD-10-CM

## 2024-05-24 DIAGNOSIS — M62.81 GENERALIZED MUSCLE WEAKNESS: ICD-10-CM

## 2024-05-24 DIAGNOSIS — I10 BENIGN ESSENTIAL HYPERTENSION: ICD-10-CM

## 2024-05-24 DIAGNOSIS — Z91.81 PERSONAL HISTORY OF FALL: ICD-10-CM

## 2024-05-24 DIAGNOSIS — E11.22 TYPE 2 DIABETES MELLITUS WITH STAGE 3A CHRONIC KIDNEY DISEASE, WITHOUT LONG-TERM CURRENT USE OF INSULIN (H): ICD-10-CM

## 2024-05-24 DIAGNOSIS — I89.0 CHRONIC ACQUIRED LYMPHEDEMA: ICD-10-CM

## 2024-05-24 DIAGNOSIS — N18.31 TYPE 2 DIABETES MELLITUS WITH STAGE 3A CHRONIC KIDNEY DISEASE, WITHOUT LONG-TERM CURRENT USE OF INSULIN (H): ICD-10-CM

## 2024-05-24 DIAGNOSIS — Z85.42 HISTORY OF UTERINE CANCER: ICD-10-CM

## 2024-05-24 DIAGNOSIS — K59.01 SLOW TRANSIT CONSTIPATION: ICD-10-CM

## 2024-05-24 DIAGNOSIS — E66.01 MORBID OBESITY (H): ICD-10-CM

## 2024-05-24 DIAGNOSIS — I89.0 LYMPHEDEMA: ICD-10-CM

## 2024-05-24 DIAGNOSIS — F32.A DEPRESSION, UNSPECIFIED DEPRESSION TYPE: ICD-10-CM

## 2024-05-24 DIAGNOSIS — Z86.73 HISTORY OF TIA (TRANSIENT ISCHEMIC ATTACK) AND STROKE: ICD-10-CM

## 2024-05-24 DIAGNOSIS — Z86.718 PERSONAL HISTORY OF DVT (DEEP VEIN THROMBOSIS): ICD-10-CM

## 2024-05-24 DIAGNOSIS — R53.81 PHYSICAL DECONDITIONING: ICD-10-CM

## 2024-05-24 DIAGNOSIS — Z79.84 DIABETES MELLITUS TREATED WITH ORAL MEDICATION (H): ICD-10-CM

## 2024-05-24 DIAGNOSIS — E11.9 DIABETES MELLITUS TREATED WITH ORAL MEDICATION (H): ICD-10-CM

## 2024-05-24 PROCEDURE — 99309 SBSQ NF CARE MODERATE MDM 30: CPT | Performed by: NURSE PRACTITIONER

## 2024-05-24 NOTE — LETTER
5/24/2024        RE: Alana Sheikh  [address]  [place] [address]         [facility] GERIATRICS        Visit Type: RECHECK     Facility:   [facility] ([facility]) [[id number]]         History of Present Illness: Alana Sheikh is a 84 year old female     Past medical history includes  Distal right femur fracture s/p IMN 5/2/24   History of falls with injury   abnormal gait uses walker at baseline  Pain  Weakness/deconditioning  Subdural hematoma  Right maxillary alveolar ridge fracture  Right facial contusions with laceration  Chronic L1 compression fracture  Hypertension  CKD  Hypomagnesia  GERD  Diabetes type 2  Morbid obesity  Chronic lymphedema  History of TIA (2019) with MRI suggestive of subacute right frontal 4mm CVA   Depression  History of uterine cancer status post TEO/BSO  Remote history of DVT.  Not on anticoagulation     On 5/1/2024, patient presented in the [facility] emergency room after mechanical fall resulting in facial trauma, SDH and a right femur fracture.  She was transferred to [facility].   She underwent IMN on 5/2/2024.  Anticoagulation was held due to head bleed and has follow-up with neurosurgery on 5/10/2024.     In 5/10/2024, patient was readmitted to the hospital concern of small bowel obstruction.  She did have large bowel movement in the emergency room.    On 5/22/24, pt saw ortho Managing pain with acetaminophen 1000mg TID, no narcotics. Compliant with the knee immobilizer although it does slide down her leg due to leg size. Has not been bending the knee but notes it sometimes bends slightly with transfers when the immobilizer is too low. Remains on lovenox. Chronic lower extremity edema issues, on furosemide, does not feel it has worsened since surgery.       Today she is VALLE with exertions  She saw [facility] TBI clinic today  She has received furosemide 20 mg x 2 days  Weight is stable   VS stable    Current Outpatient Medications   Medication Sig  "Dispense Refill     acetaminophen 500 MG CAPS Take 2 capsules by mouth 3 times daily       atorvastatin (LIPITOR) 20 MG tablet Take 1 tablet by mouth at bedtime       calcium carbonate 500 mg, elemental, (OSCAL) 500 MG tablet Take 500 mg by mouth 2 times daily       citalopram (CELEXA) 10 MG tablet Take 1 tablet by mouth every morning       clopidogrel (PLAVIX) 75 MG tablet Take 75 mg by mouth daily       enoxaparin ANTICOAGULANT (LOVENOX) 40 MG/0.4ML syringe Inject 40 mg Subcutaneous daily DVT ppx       famotidine (PEPCID) 40 MG tablet Take 20 mg by mouth 2 times daily       furosemide (LASIX) 20 MG tablet Take 20 mg by mouth daily as needed (weight gain or leg swelling)       glimepiride (AMARYL) 1 MG tablet Take 2 mg by mouth every morning (before breakfast)       lisinopril (ZESTRIL) 5 MG tablet Take 5 mg by mouth at bedtime       magnesium oxide (MAG-OX) 400 MG tablet Take 400 mg by mouth daily       Multiple Vitamins-Minerals (CEROVITE SENIOR) TABS Take 1 tablet by mouth daily       nystatin (MYCOSTATIN) 021862 UNIT/GM external cream Apply topically 2 times daily       pioglitazone (ACTOS) 30 MG tablet Take 1 tablet by mouth every morning       polyethylene glycol (PEG 3350) 17 g packet Take 17 g by mouth daily       senna (SENOKOT) 8.6 MG tablet Take 8.6 mg by mouth 2 times daily       vitamin D2 (ERGOCALCIFEROL) 92186 units (1250 mcg) capsule Take 50,000 Units by mouth once a week         ALLERGIES:Metformin    Vitals:  /65   Pulse 87   Temp 97.9  F (36.6  C)   Resp 20   Ht 1.615 m (5' 3.6\")   Wt 118.6 kg (261 lb 8 oz)   SpO2 93%   BMI 45.45 kg/m    Body mass index is 45.45 kg/m .    Review of Systems   Negative see HPI    Physical Exam  Alert, obese, clear lungs but appears to be working hard at breathing    Labs:     Most Recent 3 CBC's:  Recent Labs   Lab Test 05/20/24  0619   WBC 4.5   HGB 10.1*   *        Most Recent 3 BMP's:  Recent Labs   Lab Test 05/20/24  0619    "   POTASSIUM 4.3   CHLORIDE 106   CO2 27   BUN 23.2*   CR 0.74   ANIONGAP 9   VALERIY 9.2   *         Assessment/Plan:    (S72.401D) Closed fracture of distal end of right femur with routine healing, unspecified fracture morphology, subsequent encounter  (primary encounter diagnosis)  (Z47.89) Orthopedic aftercare  (M62.81) Generalized muscle weakness  (R53.81) Physical deconditioning  (Z91.81) Personal history of fall  Comment:   On 5/1/2024, patient fell resulting in facial trauma, SDH and a right femur fracture.  She was [place] [facility]   She underwent IMN on 5/2/2024.   DVT prophylaxis - taking Plavix and lovenox.    Pain Medications - scheduled Tylenol 1000 mg TID   Bowel Medications - takes senna BID and miralax   Bone Health - takes calcium and Vitamin D  Weight bearing status - NWB RLE, no aggressive knee motion.    Incision - incisions are CDI.  See photos  Follow up - ordered AP/ Pelvis, Ap/ Lateral of the hip and AP lateral Knee  Follow up - 6 weeks with either [facility] [place]o or  ortho NP/PA with  xrays prior AP/ Pelvis, Ap/ Lateral of the hip   Working with therapy  BMP and CBC stable.   Plan:   Needs a 6 week follow up with ortho at [facility]  Pt having chaparro - start lasix 40 mg daily x 3 days then 20 mg daily       (S06.5XAA) Subdural hematoma (H)  (S02.40CD) Closed fracture of right side of maxilla with routine healing, subsequent encounter  (S00.83XD) Contusion of other part of head, subsequent encounter  (S01.81XD) Facial laceration, subsequent encounter  (Z86.73) History of TIA (transient ischemic attack) and stroke  (Z86.718) Personal history of DVT (deep vein thrombosis)  Comment:   pt fell hit head was on Plavix at that time.   Per ENT, Fracture only minimally displaced and non-operative management was recommended with soft diet.   Pt received 2 unit PRBC in hospital  Lovenox was restarted on 5/13  Plavix was restarted n 5/14  Had TBI clinic appt 05/24/24  Plan:   CT on 5/24  Dental referral for  new upper dentures       (S32.010D) Compression fracture of L1 vertebra with routine healing, subsequent encounter  Comment: chronic  Tylenol for pain   Plan: Continue with plan of care no changes at this time, adjustment as needed        (I10) Benign essential hypertension  Comment: chronic  Taking lisinopril  Plan: continue with lasix 40 mg x 3 days and then lasix 20 mg daily      (N18.31) Stage 3a chronic kidney disease (H)  Comment: chronic  Baseline Cr ~0.8, GFR >60   Personally reviewed her BMP from 5/10/2024 and creatinine was stable  Plan: avoid nephrotoxic medications        (E11.22,  N18.31,  Z79.4) Type 2 diabetes mellitus with stage 3a chronic kidney disease, with long-term current use of insulin (H)  (E66.01) Morbid obesity (H)  (E11.9,  Z79.84) Diabetes mellitus treated with oral medication (H)  Comment: chronic  HgbA1c 6.9% 5/3/24   Taking Actos and Amaryl   Plan: monitor blood sugars      (I89.0) Chronic acquired hypothyroidism  Comment: chronic  Taking levothyroxine.   Plan: monitor TSH yearly        (F32.A) Depression, unspecified depression type  Comment: chronic.   Taking citalopram  Plan: Continue with plan of care no changes at this time, adjustment as needed        (Z85.42) History of uterine cancer  Comment: hx of   Plan: Continue with plan of care no changes at this time, adjustment as needed     (K59.03) drug-induced constipation  Comment: Patient was hospitalized with potential small bowel obstruction did have bowel movement in the hospital.  Currently taking senna twice daily  Comment: Continue with plan of care no changes at this time, adjustment as needed    Electronically signed by:    CLIF Jade CNP on 5/24/2024 at 2:14 PM        MEDICATIONS:  MED REC REQUIRED  Post Medication Reconciliation Status:  Medication reconciliation previously completed during another office visit      [facility] GERIATRICS  Face to Face and Medical Necessity Statement for DME Provider  visit    Patient: Alana Sheikh  Gender: female  YOB: 1940  [facility] Medical Record Number: [id number]  Demographics:  [address]  [place] [address]  [phone] (home)   Social Security Number: (Not on file)  Primary Care Provider: Gilberto Huerta  Insurance: Payor: MEDICARE / Plan: MEDICARE / Product Type: Medicare /     HPI: Alana Sheikh is a 84 year old (1940), who is being seen today for a face to face provider visit at [facility] ([facility]) [[id number]]. Medical necessity statement for DME included.     This patient requires the following: DME Ordered and Medical Necessity Statement     [facility]  Face to Face and Medical Necessity Statement for DME Provider visit    Patient: Alana Sheikh  Gender: female  YOB: 1940  [facility] Medical Record Number: [id number]  Demographics:  [address]  [place] [address]  [phone] (home)   Social Security Number: (Not on file)  Primary Care Provider: Gilberto Huerta  Insurance: Payor: MEDICARE / Plan: MEDICARE / Product Type: Medicare /     HPI: Alana Sheikh is a 84 year old (1940), who is being seen today for a face to face provider visit at [facility] ([facility]) [[id number]]. Medical necessity statement for DME included.     This patient requires the following: DME Ordered and Medical Necessity Statement   Hospital bed: fully electronic with 1/2 side rails   Mechanical lift: fully electric  Pt needing above DME with expected length of need of 99 years due to medical necessity associated with following diagnosis:    Wheelchair Documentation  Size: 24 inch wide, 18 inch deep and 17 inch height, arthur height required due to short stature and to allow patient to propel with her legs  Corresponding cushion: Yes: Standard  Standard foot rests: : bilateral elevating leg rests with wheelchair calf protector due to BLE edema and RLE fracture, pain, and brace.  Elevating leg rests: :  bilateral elevating leg rests with wheelchair calf protector due to BLE edema and RLE fracture, pain, and brace.  Arm rests: Yes: Standard  Lap tray: No  Dose the patient use oxygen? No   Is the patient able to propel wheelchair? Yes If no why not?  And is there someone who can?  Yes  1. The patient has mobility limitations that impairs their ability to participate in one or more mobility related activities: Toileting, Feeding, Grooming, and Bathing.  The wheelchair is suitable and necessary for use in the patient's home.  2. The patient's mobility limitations cannot be safely resolved by using a cane/walker: Patient's mobility issues cannot be resolved with a cane or walker and she is nonweightbearing on her right leg     Reason why a cane or walker will not meet the patient's needs. (ie: balance, tolerance, level of assistance) see above  3. The patients home has adequate access to use a manual wheelchair:Yes  4. The use of a manual wheelchair on a regular basis will improve the patients ability to participate in mobility related ADL's at home:Yes  5. The patient is willing to use a manual wheelchair at home:Yes  6. The patient has adequate upper body strength and the mental capability to safely use a manual wheelchair and/or has a caregiver that is able to assist: Yes  7. Does the patient have a lower extremity injury or edema?Yes  Reason for Type of Wheelchair  Patient weight 261 pounds  24 inch wide x 18 inch deep x 17 inch height, arthur height required due to short stature and to allow patient to propel with her legs and bilateral elevating leg rests with wheelchair calf protector due to BLE edema and RLE fracture, pain, and brace.    Alana Sheikh has mobility limitations that impair her ability to participate in toileting, transferring, dressing, grooming, ambulation, and ADLs/MRADLs. A 24 inch wide x 18 inch deep x 17 inch height, arthur height required due to short stature and to allow patient to propel with  her legs and bilateral elevating leg rests with wheelchair calf protector due to BLE edema and RLE fracture, pain, and brace is suitable and necessary for use in the patient's home, with adequate space and access in the home. The patient is able to propel the 24 inch wide x 18 inch deep x 17 inch height, arthur height required due to short stature and to allow patient to propel with her legs and bilateral elevating leg rests with wheelchair calf protector due to BLE edema and RLE fracture, pain, and brace willing the use the manual w/c at home, and also has caregivers than can assist. The patient has adequate upper body strength and the mental capacity to safety use a 24 inch wide x 18 inch deep x 17 inch height, arthur height required due to short stature and to allow patient to propel with her legs and bilateral elevating leg rests with wheelchair calf protector due to BLE edema and RLE fracture, pain, and brace above and has caregivers who can assist. Patient's mobility limitations cannot be resolved with a cane, walker, or other assistive device due to impaired strength, impaired balance, low functional activity tolerance, and high fall risk. The use of a manual w/c on a regular basis will improve the patient's ability to participate in MRADLs in the home    Based on my assessment, the patient has a medical condition due to physical deconditioning secondary to closed fracture of right femur, subdural hematoma, lymphedema, obesity, deconditioning and muscle weakness that requires positioning of the body in ways that is not feasible with an ordinary bed.  The patient requires positioning of the body in ways not feasible with an ordinary bed in order to alleviate pain due to physical deconditioning secondary to closed fracture of right femur, subdural hematomoa, lymphedema, obesity, deconditioning and muscle weakness.   The patient requires the head of the bed to be elevated more than 30 degrees most of the time due to  "closed femur fracture and obesity.  The patient requires a bed height different than a fixed height hospital bed to permit transfers to a chair, wheelchair .  The patient requires frequent changes in body position and/or has an immediate need for a change in body positioning due to physical deconditioning secondary to closed fracture of right femur, subdural hematomoa, lymphedema, obesity, deconditioning and muscle weakness.    Based on my assessment, the Alana Sheikh needs a adama lift as she is non weight bearing on her right leg due to closed femur fracture.  The pt requires a adama lift to go from bed to chair and vice versa.        Pt needing above DME with expected length of need of 99  years due to medical necessity associated with following diagnosis:     Closed fracture of distal end of right femur with routine healing, unspecified fracture morphology, subsequent encounter  Orthopedic aftercare  Generalized muscle weakness  Physical deconditioning  Personal history of fall  SDH (subdural hematoma) (H)  History of TIA (transient ischemic attack) and stroke  Personal history of DVT (deep vein thrombosis)  Benign essential hypertension  Stage 3a chronic kidney disease (H)  Type 2 diabetes mellitus with stage 3a chronic kidney disease, without long-term current use of insulin (H)  Lymphedema  Diabetes mellitus treated with oral medication (H)  Depression, unspecified depression type  Chronic acquired lymphedema  History of uterine cancer  Morbid obesity (H)  Slow transit constipation      Past Medical History:  See above     Review of Systems:  See above    Exam:  Vitals: /65   Pulse 87   Temp 97.9  F (36.6  C)   Resp 20   Ht 1.615 m (5' 3.6\")   Wt 118.6 kg (261 lb 8 oz)   SpO2 93%   BMI 45.45 kg/m    BMI: Body mass index is 45.45 kg/m .  See above    Assessment/Plan:  1. Closed fracture of distal end of right femur with routine healing, unspecified fracture morphology, subsequent encounter    2. " Orthopedic aftercare    3. Generalized muscle weakness    4. Physical deconditioning    5. Personal history of fall    6. SDH (subdural hematoma) (H)    7. History of TIA (transient ischemic attack) and stroke    8. Personal history of DVT (deep vein thrombosis)    9. Benign essential hypertension    10. Stage 3a chronic kidney disease (H)    11. Type 2 diabetes mellitus with stage 3a chronic kidney disease, without long-term current use of insulin (H)    12. Lymphedema    13. Diabetes mellitus treated with oral medication (H)    14. Depression, unspecified depression type    15. Chronic acquired lymphedema    16. History of uterine cancer    17. Morbid obesity (H)    18. Slow transit constipation        Orders:  1. Facility staff/TC to contact DME company to get their order form for provider to fill out    ELECTRONICALLY SIGNED BY [facility] CERTIFIED PROVIDER: CLIF Jade CNP   NPI: [id number]  [facility] GERIATRICS  [address] [place] [address]            Sincerely,        CLIF Jade CNP

## 2024-05-24 NOTE — PROGRESS NOTES
[facility] GERIATRICS        Visit Type: RECHECK     Facility:   [facility] (TCU) [[id number]]         History of Present Illness: Alana Sheikh is a 84 year old female     Past medical history includes  Distal right femur fracture s/p IMN 5/2/24   History of falls with injury   abnormal gait uses walker at baseline  Pain  Weakness/deconditioning  Subdural hematoma  Right maxillary alveolar ridge fracture  Right facial contusions with laceration  Chronic L1 compression fracture  Hypertension  CKD  Hypomagnesia  GERD  Diabetes type 2  Morbid obesity  Chronic lymphedema  History of TIA (2019) with MRI suggestive of subacute right frontal 4mm CVA   Depression  History of uterine cancer status post TEO/BSO  Remote history of DVT.  Not on anticoagulation     On 5/1/2024, patient presented in the [facility] emergency room after mechanical fall resulting in facial trauma, SDH and a right femur fracture.  She was transferred to [facility].   She underwent IMN on 5/2/2024.  Anticoagulation was held due to head bleed and has follow-up with neurosurgery on 5/10/2024.     In 5/10/2024, patient was readmitted to the hospital concern of small bowel obstruction.  She did have large bowel movement in the emergency room.    On 5/22/24, pt saw ortho Managing pain with acetaminophen 1000mg TID, no narcotics. Compliant with the knee immobilizer although it does slide down her leg due to leg size. Has not been bending the knee but notes it sometimes bends slightly with transfers when the immobilizer is too low. Remains on lovenox. Chronic lower extremity edema issues, on furosemide, does not feel it has worsened since surgery.       Today she is VALLE with exertions  She saw [facility] TBI clinic today  She has received furosemide 20 mg x 2 days  Weight is stable   VS stable    Current Outpatient Medications   Medication Sig Dispense Refill    acetaminophen 500 MG CAPS Take 2 capsules by mouth 3 times daily       "atorvastatin (LIPITOR) 20 MG tablet Take 1 tablet by mouth at bedtime      calcium carbonate 500 mg, elemental, (OSCAL) 500 MG tablet Take 500 mg by mouth 2 times daily      citalopram (CELEXA) 10 MG tablet Take 1 tablet by mouth every morning      clopidogrel (PLAVIX) 75 MG tablet Take 75 mg by mouth daily      enoxaparin ANTICOAGULANT (LOVENOX) 40 MG/0.4ML syringe Inject 40 mg Subcutaneous daily DVT ppx      famotidine (PEPCID) 40 MG tablet Take 20 mg by mouth 2 times daily      furosemide (LASIX) 20 MG tablet Take 20 mg by mouth daily as needed (weight gain or leg swelling)      glimepiride (AMARYL) 1 MG tablet Take 2 mg by mouth every morning (before breakfast)      lisinopril (ZESTRIL) 5 MG tablet Take 5 mg by mouth at bedtime      magnesium oxide (MAG-OX) 400 MG tablet Take 400 mg by mouth daily      Multiple Vitamins-Minerals (CEROVITE SENIOR) TABS Take 1 tablet by mouth daily      nystatin (MYCOSTATIN) 936762 UNIT/GM external cream Apply topically 2 times daily      pioglitazone (ACTOS) 30 MG tablet Take 1 tablet by mouth every morning      polyethylene glycol (PEG 3350) 17 g packet Take 17 g by mouth daily      senna (SENOKOT) 8.6 MG tablet Take 8.6 mg by mouth 2 times daily      vitamin D2 (ERGOCALCIFEROL) 27738 units (1250 mcg) capsule Take 50,000 Units by mouth once a week         ALLERGIES:Metformin    Vitals:  /65   Pulse 87   Temp 97.9  F (36.6  C)   Resp 20   Ht 1.615 m (5' 3.6\")   Wt 118.6 kg (261 lb 8 oz)   SpO2 93%   BMI 45.45 kg/m    Body mass index is 45.45 kg/m .    Review of Systems   Negative see HPI    Physical Exam  Alert, obese, clear lungs but appears to be working hard at breathing    Labs:     Most Recent 3 CBC's:  Recent Labs   Lab Test 05/20/24  0619   WBC 4.5   HGB 10.1*   *        Most Recent 3 BMP's:  Recent Labs   Lab Test 05/20/24  0619      POTASSIUM 4.3   CHLORIDE 106   CO2 27   BUN 23.2*   CR 0.74   ANIONGAP 9   VALERIY 9.2   * "         Assessment/Plan:    (S72.401D) Closed fracture of distal end of right femur with routine healing, unspecified fracture morphology, subsequent encounter  (primary encounter diagnosis)  (Z47.89) Orthopedic aftercare  (M62.81) Generalized muscle weakness  (R53.81) Physical deconditioning  (Z91.81) Personal history of fall  Comment:   On 5/1/2024, patient fell resulting in facial trauma, SDH and a right femur fracture.  She was transferred to [facility].   She underwent IMN on 5/2/2024.   DVT prophylaxis - taking Plavix and lovenox.    Pain Medications - scheduled Tylenol 1000 mg TID   Bowel Medications - takes senna BID and miralax   Bone Health - takes calcium and Vitamin D  Weight bearing status - NWB RLE, no aggressive knee motion.    Incision - incisions are CDI.  See photos  Follow up - ordered AP/ Pelvis, Ap/ Lateral of the hip and AP lateral Knee  Follow up - 6 weeks with either [facility] ortho or  ortho NP/PA with  xrays prior AP/ Pelvis, Ap/ Lateral of the hip   Working with therapy  BMP and CBC stable.   Plan:   Needs a 6 week follow up with ortho at [facility]  Pt having chaparro - start lasix 40 mg daily x 3 days then 20 mg daily       (S06.5XAA) Subdural hematoma (H)  (S02.40CD) Closed fracture of right side of maxilla with routine healing, subsequent encounter  (S00.83XD) Contusion of other part of head, subsequent encounter  (S01.81XD) Facial laceration, subsequent encounter  (Z86.73) History of TIA (transient ischemic attack) and stroke  (Z86.718) Personal history of DVT (deep vein thrombosis)  Comment:   pt fell hit head was on Plavix at that time.   Per ENT, Fracture only minimally displaced and non-operative management was recommended with soft diet.   Pt received 2 unit PRBC in hospital  Lovenox was restarted on 5/13  Plavix was restarted n 5/14  Had TBI clinic appt 05/24/24  Plan:   CT on 5/24  Dental referral for new upper dentures       (S32.010D) Compression fracture of L1 vertebra with routine healing,  subsequent encounter  Comment: chronic  Tylenol for pain   Plan: Continue with plan of care no changes at this time, adjustment as needed        (I10) Benign essential hypertension  Comment: chronic  Taking lisinopril  Plan: continue with lasix 40 mg x 3 days and then lasix 20 mg daily      (N18.31) Stage 3a chronic kidney disease (H)  Comment: chronic  Baseline Cr ~0.8, GFR >60   Personally reviewed her BMP from 5/10/2024 and creatinine was stable  Plan: avoid nephrotoxic medications        (E11.22,  N18.31,  Z79.4) Type 2 diabetes mellitus with stage 3a chronic kidney disease, with long-term current use of insulin (H)  (E66.01) Morbid obesity (H)  (E11.9,  Z79.84) Diabetes mellitus treated with oral medication (H)  Comment: chronic  HgbA1c 6.9% 5/3/24   Taking Actos and Amaryl   Plan: monitor blood sugars      (I89.0) Chronic acquired hypothyroidism  Comment: chronic  Taking levothyroxine.   Plan: monitor TSH yearly        (F32.A) Depression, unspecified depression type  Comment: chronic.   Taking citalopram  Plan: Continue with plan of care no changes at this time, adjustment as needed        (Z85.42) History of uterine cancer  Comment: hx of   Plan: Continue with plan of care no changes at this time, adjustment as needed     (K59.03) drug-induced constipation  Comment: Patient was hospitalized with potential small bowel obstruction did have bowel movement in the hospital.  Currently taking senna twice daily  Comment: Continue with plan of care no changes at this time, adjustment as needed    Electronically signed by:    CLIF Jade CNP on 5/24/2024 at 2:14 PM        MEDICATIONS:  MED REC REQUIRED  Post Medication Reconciliation Status:  Medication reconciliation previously completed during another office visit      [facility] GERIATRICS  Face to Face and Medical Necessity Statement for DME Provider visit    Patient: Alana Sheikh  Gender: female  YOB: 1940  [facility]  Record Number: [id number]  Demographics:  [address]  [place] [address]  [phone] (home)   Social Security Number: (Not on file)  Primary Care Provider: Gilberto Huerta  Insurance: Payor: MEDICARE / Plan: MEDICARE / Product Type: Medicare /     HPI: Alana Sheikh is a 84 year old (1940), who is being seen today for a face to face provider visit at [facility] ([facility]) [[id number]]. Medical necessity statement for DME included.     This patient requires the following: DME Ordered and Medical Necessity Statement     [facility] GERIATRICS  Face to Face and Medical Necessity Statement for DME Provider visit    Patient: Alana Sheikh  Gender: female  YOB: 1940  [place] [facility] Record Number: [id number]  Demographics:  [address]  [place] [address]  [phone] (home)   Social Security Number: (Not on file)  Primary Care Provider: Gilberto Huerta  Insurance: Payor: MEDICARE / Plan: MEDICARE / Product Type: Medicare /     HPI: Alana Sheikh is a 84 year old (1940), who is being seen today for a face to face provider visit at [facility] ([facility]) [[id number]]. Medical necessity statement for DME included.     This patient requires the following: DME Ordered and Medical Necessity Statement   Hospital bed: fully electronic with 1/2 side rails   Mechanical lift: fully electric  Pt needing above DME with expected length of need of 99 years due to medical necessity associated with following diagnosis:    Wheelchair Documentation  Size: 24 inch wide, 18 inch deep and 17 inch height, arthur height required due to short stature and to allow patient to propel with her legs  Corresponding cushion: Yes: Standard  Standard foot rests: : bilateral elevating leg rests with wheelchair calf protector due to BLE edema and RLE fracture, pain, and brace.  Elevating leg rests: : bilateral elevating leg rests with wheelchair calf protector due to BLE edema and RLE fracture,  pain, and brace.  Arm rests: Yes: Standard  Lap tray: No  Dose the patient use oxygen? No   Is the patient able to propel wheelchair? Yes If no why not?  And is there someone who can?  Yes  1. The patient has mobility limitations that impairs their ability to participate in one or more mobility related activities: Toileting, Feeding, Grooming, and Bathing.  The wheelchair is suitable and necessary for use in the patient's home.  2. The patient's mobility limitations cannot be safely resolved by using a cane/walker: Patient's mobility issues cannot be resolved with a cane or walker and she is nonweightbearing on her right leg     Reason why a cane or walker will not meet the patient's needs. (ie: balance, tolerance, level of assistance) see above  3. The patients home has adequate access to use a manual wheelchair:Yes  4. The use of a manual wheelchair on a regular basis will improve the patients ability to participate in mobility related ADL's at home:Yes  5. The patient is willing to use a manual wheelchair at home:Yes  6. The patient has adequate upper body strength and the mental capability to safely use a manual wheelchair and/or has a caregiver that is able to assist: Yes  7. Does the patient have a lower extremity injury or edema?Yes  Reason for Type of Wheelchair  Patient weight 261 pounds  24 inch wide x 18 inch deep x 17 inch height, arthur height required due to short stature and to allow patient to propel with her legs and bilateral elevating leg rests with wheelchair calf protector due to BLE edema and RLE fracture, pain, and brace.    Alana Sheikh has mobility limitations that impair her ability to participate in toileting, transferring, dressing, grooming, ambulation, and ADLs/MRADLs. A 24 inch wide x 18 inch deep x 17 inch height, arthur height required due to short stature and to allow patient to propel with her legs and bilateral elevating leg rests with wheelchair calf protector due to BLE edema and  RLE fracture, pain, and brace is suitable and necessary for use in the patient's home, with adequate space and access in the home. The patient is able to propel the 24 inch wide x 18 inch deep x 17 inch height, arthur height required due to short stature and to allow patient to propel with her legs and bilateral elevating leg rests with wheelchair calf protector due to BLE edema and RLE fracture, pain, and brace willing the use the manual w/c at home, and also has caregivers than can assist. The patient has adequate upper body strength and the mental capacity to safety use a 24 inch wide x 18 inch deep x 17 inch height, arthur height required due to short stature and to allow patient to propel with her legs and bilateral elevating leg rests with wheelchair calf protector due to BLE edema and RLE fracture, pain, and brace above and has caregivers who can assist. Patient's mobility limitations cannot be resolved with a cane, walker, or other assistive device due to impaired strength, impaired balance, low functional activity tolerance, and high fall risk. The use of a manual w/c on a regular basis will improve the patient's ability to participate in MRADLs in the home    Based on my assessment, the patient has a medical condition due to physical deconditioning secondary to closed fracture of right femur, subdural hematoma, lymphedema, obesity, deconditioning and muscle weakness that requires positioning of the body in ways that is not feasible with an ordinary bed.  The patient requires positioning of the body in ways not feasible with an ordinary bed in order to alleviate pain due to physical deconditioning secondary to closed fracture of right femur, subdural hematomoa, lymphedema, obesity, deconditioning and muscle weakness.   The patient requires the head of the bed to be elevated more than 30 degrees most of the time due to closed femur fracture and obesity.  The patient requires a bed height different than a fixed  "height hospital bed to permit transfers to a chair, wheelchair .  The patient requires frequent changes in body position and/or has an immediate need for a change in body positioning due to physical deconditioning secondary to closed fracture of right femur, subdural hematomoa, lymphedema, obesity, deconditioning and muscle weakness.    Based on my assessment, the Alana Sheikh needs a adama lift as she is non weight bearing on her right leg due to closed femur fracture.  The pt requires a adama lift to go from bed to chair and vice versa.        Pt needing above DME with expected length of need of 99  years due to medical necessity associated with following diagnosis:     Closed fracture of distal end of right femur with routine healing, unspecified fracture morphology, subsequent encounter  Orthopedic aftercare  Generalized muscle weakness  Physical deconditioning  Personal history of fall  SDH (subdural hematoma) (H)  History of TIA (transient ischemic attack) and stroke  Personal history of DVT (deep vein thrombosis)  Benign essential hypertension  Stage 3a chronic kidney disease (H)  Type 2 diabetes mellitus with stage 3a chronic kidney disease, without long-term current use of insulin (H)  Lymphedema  Diabetes mellitus treated with oral medication (H)  Depression, unspecified depression type  Chronic acquired lymphedema  History of uterine cancer  Morbid obesity (H)  Slow transit constipation      Past Medical History:  See above     Review of Systems:  See above    Exam:  Vitals: /65   Pulse 87   Temp 97.9  F (36.6  C)   Resp 20   Ht 1.615 m (5' 3.6\")   Wt 118.6 kg (261 lb 8 oz)   SpO2 93%   BMI 45.45 kg/m    BMI: Body mass index is 45.45 kg/m .  See above    Assessment/Plan:  1. Closed fracture of distal end of right femur with routine healing, unspecified fracture morphology, subsequent encounter    2. Orthopedic aftercare    3. Generalized muscle weakness    4. Physical deconditioning    5. " Personal history of fall    6. SDH (subdural hematoma) (H)    7. History of TIA (transient ischemic attack) and stroke    8. Personal history of DVT (deep vein thrombosis)    9. Benign essential hypertension    10. Stage 3a chronic kidney disease (H)    11. Type 2 diabetes mellitus with stage 3a chronic kidney disease, without long-term current use of insulin (H)    12. Lymphedema    13. Diabetes mellitus treated with oral medication (H)    14. Depression, unspecified depression type    15. Chronic acquired lymphedema    16. History of uterine cancer    17. Morbid obesity (H)    18. Slow transit constipation        Orders:  1. Facility staff/TC to contact DME company to get their order form for provider to fill out    ELECTRONICALLY SIGNED BY LIOS CERTIFIED PROVIDER: CLIF Jade CNP   NPI: [id number]  [facility]  [address] [place] [address]

## 2024-05-28 ENCOUNTER — TRANSITIONAL CARE UNIT VISIT (OUTPATIENT)
Dept: GERIATRICS | Facility: CLINIC | Age: 84
End: 2024-05-28
Payer: MEDICARE

## 2024-05-28 VITALS
OXYGEN SATURATION: 87 % | SYSTOLIC BLOOD PRESSURE: 151 MMHG | HEIGHT: 64 IN | HEART RATE: 62 BPM | TEMPERATURE: 97.5 F | WEIGHT: 261 LBS | DIASTOLIC BLOOD PRESSURE: 66 MMHG | RESPIRATION RATE: 20 BRPM | BODY MASS INDEX: 44.56 KG/M2

## 2024-05-28 DIAGNOSIS — S01.81XD FACIAL LACERATION, SUBSEQUENT ENCOUNTER: ICD-10-CM

## 2024-05-28 DIAGNOSIS — E11.22 TYPE 2 DIABETES MELLITUS WITH STAGE 3A CHRONIC KIDNEY DISEASE, WITHOUT LONG-TERM CURRENT USE OF INSULIN (H): ICD-10-CM

## 2024-05-28 DIAGNOSIS — Z91.81 PERSONAL HISTORY OF FALL: ICD-10-CM

## 2024-05-28 DIAGNOSIS — E11.9 DIABETES MELLITUS TREATED WITH ORAL MEDICATION (H): ICD-10-CM

## 2024-05-28 DIAGNOSIS — S02.40CD CLOSED FRACTURE OF RIGHT SIDE OF MAXILLA WITH ROUTINE HEALING, SUBSEQUENT ENCOUNTER: ICD-10-CM

## 2024-05-28 DIAGNOSIS — I10 BENIGN ESSENTIAL HYPERTENSION: ICD-10-CM

## 2024-05-28 DIAGNOSIS — Z86.73 HISTORY OF TIA (TRANSIENT ISCHEMIC ATTACK) AND STROKE: ICD-10-CM

## 2024-05-28 DIAGNOSIS — Z85.42 HISTORY OF UTERINE CANCER: ICD-10-CM

## 2024-05-28 DIAGNOSIS — S06.5XAA SDH (SUBDURAL HEMATOMA) (H): ICD-10-CM

## 2024-05-28 DIAGNOSIS — Z86.718 PERSONAL HISTORY OF DVT (DEEP VEIN THROMBOSIS): ICD-10-CM

## 2024-05-28 DIAGNOSIS — N18.31 STAGE 3A CHRONIC KIDNEY DISEASE (H): ICD-10-CM

## 2024-05-28 DIAGNOSIS — S00.83XD CONTUSION OF OTHER PART OF HEAD, SUBSEQUENT ENCOUNTER: ICD-10-CM

## 2024-05-28 DIAGNOSIS — R53.81 PHYSICAL DECONDITIONING: ICD-10-CM

## 2024-05-28 DIAGNOSIS — K59.01 SLOW TRANSIT CONSTIPATION: ICD-10-CM

## 2024-05-28 DIAGNOSIS — N18.31 TYPE 2 DIABETES MELLITUS WITH STAGE 3A CHRONIC KIDNEY DISEASE, WITHOUT LONG-TERM CURRENT USE OF INSULIN (H): ICD-10-CM

## 2024-05-28 DIAGNOSIS — Z47.89 ORTHOPEDIC AFTERCARE: ICD-10-CM

## 2024-05-28 DIAGNOSIS — E03.9 ACQUIRED HYPOTHYROIDISM: ICD-10-CM

## 2024-05-28 DIAGNOSIS — Z79.84 DIABETES MELLITUS TREATED WITH ORAL MEDICATION (H): ICD-10-CM

## 2024-05-28 DIAGNOSIS — F32.A DEPRESSION, UNSPECIFIED DEPRESSION TYPE: ICD-10-CM

## 2024-05-28 DIAGNOSIS — S72.401D CLOSED FRACTURE OF DISTAL END OF RIGHT FEMUR WITH ROUTINE HEALING, UNSPECIFIED FRACTURE MORPHOLOGY, SUBSEQUENT ENCOUNTER: Primary | ICD-10-CM

## 2024-05-28 DIAGNOSIS — E66.01 MORBID OBESITY (H): ICD-10-CM

## 2024-05-28 DIAGNOSIS — S32.010D COMPRESSION FRACTURE OF L1 VERTEBRA WITH ROUTINE HEALING, SUBSEQUENT ENCOUNTER: ICD-10-CM

## 2024-05-28 DIAGNOSIS — M62.81 GENERALIZED MUSCLE WEAKNESS: ICD-10-CM

## 2024-05-28 PROCEDURE — 99309 SBSQ NF CARE MODERATE MDM 30: CPT | Performed by: NURSE PRACTITIONER

## 2024-05-28 NOTE — LETTER
5/28/2024        RE: Alana Sheikh  [address]  [place] [address]         [facility]        Visit Type: RECHECK     Facility:   [facility] ([facility]) [[id number]]         History of Present Illness: Alana Sheikh is a 84 year old female     Past medical history includes  Distal right femur fracture s/p IMN 5/2/24   History of falls with injury   abnormal gait uses walker at baseline  Pain  Weakness/deconditioning  Subdural hematoma  Right maxillary alveolar ridge fracture  Right facial contusions with laceration  Chronic L1 compression fracture  Hypertension  CKD  Hypomagnesia  GERD  Diabetes type 2  Morbid obesity  Chronic lymphedema  History of TIA (2019) with MRI suggestive of subacute right frontal 4mm CVA   Depression  History of uterine cancer status post TEO/BSO  Remote history of DVT.  Not on anticoagulation     On 5/1/2024, patient presented in the [facility] emergency room after mechanical fall resulting in facial trauma, SDH and a right femur fracture.  She was transferred [place] [facility]   She underwent IMN on 5/2/2024.  Anticoagulation was held due to head bleed and has follow-up with neurosurgery on 5/10/2024.     In 5/10/2024, patient was readmitted to the hospital concern of small bowel obstruction.  She did have large bowel movement in the emergency room.    On 5/22/24, pt saw ortho Managing pain with acetaminophen 1000mg TID, no narcotics. Compliant with the knee immobilizer although it does slide down her leg due to leg size. Has not been bending the knee but notes it sometimes bends slightly with transfers when the immobilizer is too low. Remains on lovenox. Chronic lower extremity edema issues, on furosemide, does not feel it has worsened since surgery.     On 5/24/2024, patient's neurology with no new recommendations        Today she states she is short of breath when turning due to being nervous.    Nursing MAX AX2 with toileting, bathing,lower/  "upper body dressing, transfer, grooming/oral care, bed mobility. Pt is independent with feeding after set-up.   incontinent of bowel and bladder  Weight is stable   VS stable    Current Outpatient Medications   Medication Sig Dispense Refill     acetaminophen 500 MG CAPS Take 2 capsules by mouth 3 times daily       atorvastatin (LIPITOR) 20 MG tablet Take 1 tablet by mouth at bedtime       calcium carbonate 500 mg, elemental, (OSCAL) 500 MG tablet Take 500 mg by mouth 2 times daily       citalopram (CELEXA) 10 MG tablet Take 1 tablet by mouth every morning       clopidogrel (PLAVIX) 75 MG tablet Take 75 mg by mouth daily       enoxaparin ANTICOAGULANT (LOVENOX) 40 MG/0.4ML syringe Inject 40 mg Subcutaneous daily DVT ppx       famotidine (PEPCID) 40 MG tablet Take 20 mg by mouth 2 times daily       furosemide (LASIX) 20 MG tablet Take 20 mg by mouth daily as needed (weight gain or leg swelling)       glimepiride (AMARYL) 1 MG tablet Take 2 mg by mouth every morning (before breakfast)       lisinopril (ZESTRIL) 5 MG tablet Take 5 mg by mouth at bedtime       magnesium oxide (MAG-OX) 400 MG tablet Take 400 mg by mouth daily       Multiple Vitamins-Minerals (CEROVITE SENIOR) TABS Take 1 tablet by mouth daily       nystatin (MYCOSTATIN) 872011 UNIT/GM external cream Apply topically 2 times daily       pioglitazone (ACTOS) 30 MG tablet Take 1 tablet by mouth every morning       polyethylene glycol (PEG 3350) 17 g packet Take 17 g by mouth daily       senna (SENOKOT) 8.6 MG tablet Take 8.6 mg by mouth 2 times daily       vitamin D2 (ERGOCALCIFEROL) 12467 units (1250 mcg) capsule Take 50,000 Units by mouth once a week         ALLERGIES:Metformin    Vitals:  BP (!) 151/66   Pulse 62   Temp 97.5  F (36.4  C)   Resp 20   Ht 1.615 m (5' 3.6\")   Wt 118.4 kg (261 lb)   SpO2 (!) 87%   BMI 45.37 kg/m    Body mass index is 45.37 kg/m .    Review of Systems   Negative see HPI    Physical Exam  Vitals and nursing note reviewed. "   Constitutional:       Appearance: She is obese.   Cardiovascular:      Rate and Rhythm: Normal rate.   Pulmonary:      Effort: Pulmonary effort is normal.   Abdominal:      General: Bowel sounds are normal.   Skin:     Comments: Incisions on right leg healing well   Neurological:      Mental Status: She is alert and oriented to person, place, and time.   Psychiatric:         Mood and Affect: Mood normal.         Behavior: Behavior normal.         Thought Content: Thought content normal.         Judgment: Judgment normal.         Labs:     Most Recent 3 CBC's:  Recent Labs   Lab Test 05/20/24  0619   WBC 4.5   HGB 10.1*   *        Most Recent 3 BMP's:  Recent Labs   Lab Test 05/20/24  0619      POTASSIUM 4.3   CHLORIDE 106   CO2 27   BUN 23.2*   CR 0.74   ANIONGAP 9   VALERIY 9.2   *         Assessment/Plan:    (S72.401D) Closed fracture of distal end of right femur with routine healing, unspecified fracture morphology, subsequent encounter  (primary encounter diagnosis)  (Z47.89) Orthopedic aftercare  (M62.81) Generalized muscle weakness  (R53.81) Physical deconditioning  (Z91.81) Personal history of fall  Comment:   On 5/1/2024, patient fell resulting in facial trauma, SDH and a right femur fracture.  [place] [facility]   She underwent IMN on 5/2/2024.   DVT prophylaxis - taking Plavix and lovenox.    Pain Medications - scheduled Tylenol 1000 mg TID   Bowel Medications - takes senna BID and miralax   Bone Health - takes calcium and Vitamin D  Weight bearing status - NWB RLE, no aggressive knee motion.    Incision - incisions are CDI.  See photos  Follow up - ordered AP/ Pelvis, Ap/ Lateral of the hip and AP lateral Knee  Follow up - 6 weeks with either [facility] [place] or  ortho NP/PA with  xrays prior AP/ Pelvis, Ap/ Lateral of the hip   Working with therapy  BMP and CBC stable.   Plan:   Needs a 6 week follow up with ortho at [facility] [place]patient not very mobile will continue Lovenox at  this time  Continue with Lasix 20 mg daily      (S06.5XAA) Subdural hematoma (H)  (S02.40CD) Closed fracture of right side of maxilla with routine healing, subsequent encounter  (S00.83XD) Contusion of other part of head, subsequent encounter  (S01.81XD) Facial laceration, subsequent encounter  (Z86.73) History of TIA (transient ischemic attack) and stroke  (Z86.718) Personal history of DVT (deep vein thrombosis)  Comment:   pt fell hit head was on Plavix at that time.   Per ENT, Fracture only minimally displaced and non-operative management was recommended with soft diet.   Pt received 2 unit PRBC in hospital  Lovenox was restarted on 5/13  Plavix was restarted n 5/14  Had TBI clinic appt 05/24/24 with no new changes  Facial laceration healed.  Ecchymosis healing  Plan:   Dental referral for new upper dentures       (S32.010D) Compression fracture of L1 vertebra with routine healing, subsequent encounter  Comment: chronic  Tylenol for pain   Plan: Continue with plan of care no changes at this time, adjustment as needed        (I10) Benign essential hypertension  Comment: chronic  Taking lisinopril and Lasix  Plan: continue with lasix 40 mg x 3 days and then lasix 20 mg daily      (N18.31) Stage 3a chronic kidney disease (H)  Comment: chronic  Baseline Cr ~0.8, GFR >60   Personally reviewed her BMP from 5/10/2024 and creatinine was stable  Plan: avoid nephrotoxic medications        (E11.22,  N18.31,  Z79.4) Type 2 diabetes mellitus with stage 3a chronic kidney disease, with long-term current use of insulin (H)  (E66.01) Morbid obesity (H)  (E11.9,  Z79.84) Diabetes mellitus treated with oral medication (H)  Comment: chronic  HgbA1c 6.9% 5/3/24   Taking Actos and Amaryl   Blood sugars controlled  Plan: Continue with plan of care no changes at this time, adjustment as needed       (I89.0) Chronic acquired hypothyroidism  Comment: chronic  Taking levothyroxine.   Plan: monitor TSH yearly        (F32.A) Depression,  unspecified depression type  Comment: chronic.   Taking citalopram  Plan: Continue with plan of care no changes at this time, adjustment as needed        (Z85.42) History of uterine cancer  Comment: hx of   Plan: Continue with plan of care no changes at this time, adjustment as needed     (K59.03) drug-induced constipation  Comment: Patient was hospitalized with potential small bowel obstruction did have bowel movement in the hospital.  Currently taking senna twice daily  Comment: Continue with plan of care no changes at this time, adjustment as needed    Electronically signed by:    CLIF Jade CNP on 5/28/2024 at 1:46 PM            MEDICATIONS:  MED REC REQUIRED  Post Medication Reconciliation Status:  Discharge medications reconciled and changed, see notes/orders      Sincerely,        CLIF Jade CNP

## 2024-05-28 NOTE — PROGRESS NOTES
[facility]        Visit Type: RECHECK     Facility:   [facility] ([facility]) [[id number]]         History of Present Illness: Alana Sheikh is a 84 year old female     Past medical history includes  Distal right femur fracture s/p IMN 5/2/24   History of falls with injury   abnormal gait uses walker at baseline  Pain  Weakness/deconditioning  Subdural hematoma  Right maxillary alveolar ridge fracture  Right facial contusions with laceration  Chronic L1 compression fracture  Hypertension  CKD  Hypomagnesia  GERD  Diabetes type 2  Morbid obesity  Chronic lymphedema  History of TIA (2019) with MRI suggestive of subacute right frontal 4mm CVA   Depression  History of uterine cancer status post TEO/BSO  Remote history of DVT.  Not on anticoagulation     On 5/1/2024, patient presented in the [facility] emergency room after mechanical fall resulting in facial trauma, SDH and a right femur fracture.  [place] [facility]   [place] underwent IMN on 5/2/2024.  Anticoagulation was held due to head bleed and has follow-up with neurosurgery on 5/10/2024.     In 5/10/2024, patient was readmitted to the hospital concern of small bowel obstruction.  She did have large bowel movement in the emergency room.    On 5/22/24, pt saw ortho Managing pain with acetaminophen 1000mg TID, no narcotics. Compliant with the knee immobilizer although it does slide down her leg due to leg size. Has not been bending the knee but notes it sometimes bends slightly with transfers when the immobilizer is too low. Remains on lovenox. Chronic lower extremity edema issues, on furosemide, does not feel it has worsened since surgery.     On 5/24/2024, patient's neurology with no new recommendations        Today she states she is short of breath when turning due to being nervous.    Nursing MAX AX2 with toileting, bathing,lower/ upper body dressing, transfer, grooming/oral care, bed mobility. Pt is independent with  "feeding after set-up.   incontinent of bowel and bladder  Weight is stable   VS stable    Current Outpatient Medications   Medication Sig Dispense Refill    acetaminophen 500 MG CAPS Take 2 capsules by mouth 3 times daily      atorvastatin (LIPITOR) 20 MG tablet Take 1 tablet by mouth at bedtime      calcium carbonate 500 mg, elemental, (OSCAL) 500 MG tablet Take 500 mg by mouth 2 times daily      citalopram (CELEXA) 10 MG tablet Take 1 tablet by mouth every morning      clopidogrel (PLAVIX) 75 MG tablet Take 75 mg by mouth daily      enoxaparin ANTICOAGULANT (LOVENOX) 40 MG/0.4ML syringe Inject 40 mg Subcutaneous daily DVT ppx      famotidine (PEPCID) 40 MG tablet Take 20 mg by mouth 2 times daily      furosemide (LASIX) 20 MG tablet Take 20 mg by mouth daily as needed (weight gain or leg swelling)      glimepiride (AMARYL) 1 MG tablet Take 2 mg by mouth every morning (before breakfast)      lisinopril (ZESTRIL) 5 MG tablet Take 5 mg by mouth at bedtime      magnesium oxide (MAG-OX) 400 MG tablet Take 400 mg by mouth daily      Multiple Vitamins-Minerals (CEROVITE SENIOR) TABS Take 1 tablet by mouth daily      nystatin (MYCOSTATIN) 579188 UNIT/GM external cream Apply topically 2 times daily      pioglitazone (ACTOS) 30 MG tablet Take 1 tablet by mouth every morning      polyethylene glycol (PEG 3350) 17 g packet Take 17 g by mouth daily      senna (SENOKOT) 8.6 MG tablet Take 8.6 mg by mouth 2 times daily      vitamin D2 (ERGOCALCIFEROL) 38860 units (1250 mcg) capsule Take 50,000 Units by mouth once a week         ALLERGIES:Metformin    Vitals:  BP (!) 151/66   Pulse 62   Temp 97.5  F (36.4  C)   Resp 20   Ht 1.615 m (5' 3.6\")   Wt 118.4 kg (261 lb)   SpO2 (!) 87%   BMI 45.37 kg/m    Body mass index is 45.37 kg/m .    Review of Systems   Negative see HPI    Physical Exam  Vitals and nursing note reviewed.   Constitutional:       Appearance: She is obese.   Cardiovascular:      Rate and Rhythm: Normal rate. "   Pulmonary:      Effort: Pulmonary effort is normal.   Abdominal:      General: Bowel sounds are normal.   Skin:     Comments: Incisions on right leg healing well   Neurological:      Mental Status: She is alert and oriented to person, place, and time.   Psychiatric:         Mood and Affect: Mood normal.         Behavior: Behavior normal.         Thought Content: Thought content normal.         Judgment: Judgment normal.         Labs:     Most Recent 3 CBC's:  Recent Labs   Lab Test 05/20/24  0619   WBC 4.5   HGB 10.1*   *        Most Recent 3 BMP's:  Recent Labs   Lab Test 05/20/24  0619      POTASSIUM 4.3   CHLORIDE 106   CO2 27   BUN 23.2*   CR 0.74   ANIONGAP 9   VALERIY 9.2   *         Assessment/Plan:    (S72.400D) Closed fracture of distal end of right femur with routine healing, unspecified fracture morphology, subsequent encounter  (primary encounter diagnosis)  (Z47.89) Orthopedic aftercare  (M62.81) Generalized muscle weakness  (R53.81) Physical deconditioning  (Z91.81) Personal history of fall  Comment:   On 5/1/2024, patient fell resulting in facial trauma, SDH and a right femur fracture.  She was transferred to [facility] [place].   She underwent IMN on 5/2/2024.   DVT prophylaxis - taking Plavix and lovenox.    Pain Medications - scheduled Tylenol 1000 mg TID   Bowel Medications - takes senna BID and miralax   Bone Health - takes calcium and Vitamin D  Weight bearing status - NWB RLE, no aggressive knee motion.    Incision - incisions are CDI.  See photos  Follow up - ordered AP/ Pelvis, Ap/ Lateral of the hip and AP lateral Knee  Follow up - 6 weeks with either [facility] ortho or  ortho NP/PA with  xrays prior AP/ Pelvis, Ap/ Lateral of the hip   Working with therapy  BMP and CBC stable.   Plan:   Needs a 6 week follow up with ortho at [facility] [place] -patient not very mobile will continue Lovenox at this time  Continue with Lasix 20 mg daily      (S06.5XAA) Subdural hematoma (H)  (S02.40CD) Closed  fracture of right side of maxilla with routine healing, subsequent encounter  (S00.83XD) Contusion of other part of head, subsequent encounter  (S01.81XD) Facial laceration, subsequent encounter  (Z86.73) History of TIA (transient ischemic attack) and stroke  (Z86.718) Personal history of DVT (deep vein thrombosis)  Comment:   pt fell hit head was on Plavix at that time.   Per ENT, Fracture only minimally displaced and non-operative management was recommended with soft diet.   Pt received 2 unit PRBC in hospital  Lovenox was restarted on 5/13  Plavix was restarted n 5/14  Had TBI clinic appt 05/24/24 with no new changes  Facial laceration healed.  Ecchymosis healing  Plan:   Dental referral for new upper dentures       (S32.010D) Compression fracture of L1 vertebra with routine healing, subsequent encounter  Comment: chronic  Tylenol for pain   Plan: Continue with plan of care no changes at this time, adjustment as needed        (I10) Benign essential hypertension  Comment: chronic  Taking lisinopril and Lasix  Plan: continue with lasix 40 mg x 3 days and then lasix 20 mg daily      (N18.31) Stage 3a chronic kidney disease (H)  Comment: chronic  Baseline Cr ~0.8, GFR >60   Personally reviewed her BMP from 5/10/2024 and creatinine was stable  Plan: avoid nephrotoxic medications        (E11.22,  N18.31,  Z79.4) Type 2 diabetes mellitus with stage 3a chronic kidney disease, with long-term current use of insulin (H)  (E66.01) Morbid obesity (H)  (E11.9,  Z79.84) Diabetes mellitus treated with oral medication (H)  Comment: chronic  HgbA1c 6.9% 5/3/24   Taking Actos and Amaryl   Blood sugars controlled  Plan: Continue with plan of care no changes at this time, adjustment as needed       (I89.0) Chronic acquired hypothyroidism  Comment: chronic  Taking levothyroxine.   Plan: monitor TSH yearly        (F32.A) Depression, unspecified depression type  Comment: chronic.   Taking citalopram  Plan: Continue with plan of care no  changes at this time, adjustment as needed        (Z85.42) History of uterine cancer  Comment: hx of   Plan: Continue with plan of care no changes at this time, adjustment as needed     (K59.03) drug-induced constipation  Comment: Patient was hospitalized with potential small bowel obstruction did have bowel movement in the hospital.  Currently taking senna twice daily  Comment: Continue with plan of care no changes at this time, adjustment as needed    Electronically signed by:    CLIF Jade CNP on 5/28/2024 at 1:46 PM            MEDICATIONS:  MED REC REQUIRED  Post Medication Reconciliation Status:  Discharge medications reconciled and changed, see notes/orders

## 2024-05-31 ENCOUNTER — DISCHARGE SUMMARY NURSING HOME (OUTPATIENT)
Dept: GERIATRICS | Facility: CLINIC | Age: 84
End: 2024-05-31
Payer: MEDICARE

## 2024-05-31 VITALS
OXYGEN SATURATION: 92 % | HEART RATE: 80 BPM | BODY MASS INDEX: 43.52 KG/M2 | RESPIRATION RATE: 18 BRPM | SYSTOLIC BLOOD PRESSURE: 122 MMHG | WEIGHT: 254.9 LBS | HEIGHT: 64 IN | TEMPERATURE: 97.5 F | DIASTOLIC BLOOD PRESSURE: 54 MMHG

## 2024-05-31 DIAGNOSIS — E11.22 TYPE 2 DIABETES MELLITUS WITH STAGE 3A CHRONIC KIDNEY DISEASE, WITHOUT LONG-TERM CURRENT USE OF INSULIN (H): ICD-10-CM

## 2024-05-31 DIAGNOSIS — E11.9 DIABETES MELLITUS TREATED WITH ORAL MEDICATION (H): ICD-10-CM

## 2024-05-31 DIAGNOSIS — S00.83XD CONTUSION OF OTHER PART OF HEAD, SUBSEQUENT ENCOUNTER: ICD-10-CM

## 2024-05-31 DIAGNOSIS — Z79.84 DIABETES MELLITUS TREATED WITH ORAL MEDICATION (H): ICD-10-CM

## 2024-05-31 DIAGNOSIS — Z85.42 HISTORY OF UTERINE CANCER: ICD-10-CM

## 2024-05-31 DIAGNOSIS — M62.81 GENERALIZED MUSCLE WEAKNESS: ICD-10-CM

## 2024-05-31 DIAGNOSIS — N18.31 TYPE 2 DIABETES MELLITUS WITH STAGE 3A CHRONIC KIDNEY DISEASE, WITHOUT LONG-TERM CURRENT USE OF INSULIN (H): ICD-10-CM

## 2024-05-31 DIAGNOSIS — S02.40CD CLOSED FRACTURE OF RIGHT SIDE OF MAXILLA WITH ROUTINE HEALING, SUBSEQUENT ENCOUNTER: ICD-10-CM

## 2024-05-31 DIAGNOSIS — S06.5XAA SDH (SUBDURAL HEMATOMA) (H): ICD-10-CM

## 2024-05-31 DIAGNOSIS — I10 BENIGN ESSENTIAL HYPERTENSION: ICD-10-CM

## 2024-05-31 DIAGNOSIS — N18.31 STAGE 3A CHRONIC KIDNEY DISEASE (H): ICD-10-CM

## 2024-05-31 DIAGNOSIS — Z91.81 PERSONAL HISTORY OF FALL: ICD-10-CM

## 2024-05-31 DIAGNOSIS — E03.9 ACQUIRED HYPOTHYROIDISM: ICD-10-CM

## 2024-05-31 DIAGNOSIS — R53.81 PHYSICAL DECONDITIONING: ICD-10-CM

## 2024-05-31 DIAGNOSIS — S01.81XD FACIAL LACERATION, SUBSEQUENT ENCOUNTER: ICD-10-CM

## 2024-05-31 DIAGNOSIS — Z86.718 PERSONAL HISTORY OF DVT (DEEP VEIN THROMBOSIS): ICD-10-CM

## 2024-05-31 DIAGNOSIS — R26.89 NOT BEARING WEIGHT ON LOWER EXTREMITY: ICD-10-CM

## 2024-05-31 DIAGNOSIS — E66.01 MORBID OBESITY (H): ICD-10-CM

## 2024-05-31 DIAGNOSIS — K59.01 SLOW TRANSIT CONSTIPATION: ICD-10-CM

## 2024-05-31 DIAGNOSIS — S32.010D COMPRESSION FRACTURE OF L1 VERTEBRA WITH ROUTINE HEALING, SUBSEQUENT ENCOUNTER: ICD-10-CM

## 2024-05-31 DIAGNOSIS — Z47.89 ORTHOPEDIC AFTERCARE: ICD-10-CM

## 2024-05-31 DIAGNOSIS — S72.401D CLOSED FRACTURE OF DISTAL END OF RIGHT FEMUR WITH ROUTINE HEALING, UNSPECIFIED FRACTURE MORPHOLOGY, SUBSEQUENT ENCOUNTER: Primary | ICD-10-CM

## 2024-05-31 DIAGNOSIS — F32.A DEPRESSION, UNSPECIFIED DEPRESSION TYPE: ICD-10-CM

## 2024-05-31 DIAGNOSIS — Z86.73 HISTORY OF TIA (TRANSIENT ISCHEMIC ATTACK) AND STROKE: ICD-10-CM

## 2024-05-31 PROCEDURE — 99316 NF DSCHRG MGMT 30 MIN+: CPT | Performed by: NURSE PRACTITIONER

## 2024-05-31 NOTE — PROGRESS NOTES
[facility] GERIATRICS DISCHARGE SUMMARY  PATIENT'S NAME: Alana Sheikh  YOB: 1940  MEDICAL RECORD NUMBER:  [id number]  Place of Service where encounter took place:  [facility] (TCU) [[id number]]    PRIMARY CARE PROVIDER AND CLINIC RESPONSIBLE AFTER TRANSFER:   LASHAUN COBB MD, [address] [facility] [place] / [place] [address]    [facility] Provider     Transferring providers: Althea MENEZES CNP; Ivana Stern MD  Recent Hospitalization/ED:  Hospital  [facility] stay 5/10/24 to 5/11/24.  Date of SNF Admission: May 11, 2024  Date of SNF (anticipated) Discharge:  6/3/2024  Discharged to: previous assisted living  Cognitive Scores: SLUMS: 24/30 and CPT: 4.7/5.6  Physical Function: Pt requires MAX AX2 with toileting, bathing, lower/ upper body dressing, transfer, grooming/oral care, bed mobility. Pt is independent with feeding after set-up.  DME: Hospital Bed and adama, wheelchair    CODE STATUS/ADVANCE DIRECTIVES DISCUSSION:  No CPR- Do NOT Intubate   ALLERGIES: Metformin    Past medical history includes  Distal right femur fracture s/p IMN 5/2/24   History of falls with injury   abnormal gait uses walker at baseline  Pain  Weakness/deconditioning  Subdural hematoma  Right maxillary alveolar ridge fracture  Right facial contusions with laceration  Chronic L1 compression fracture  Hypertension  CKD  Hypomagnesia  GERD  Diabetes type 2  Morbid obesity  Chronic lymphedema  History of TIA (2019) with MRI suggestive of subacute right frontal 4mm CVA   Depression  History of uterine cancer status post TEO/BSO  Remote history of DVT.  Not on anticoagulation     On 5/1/2024, patient presented in the [facility] emergency room after mechanical fall resulting in facial trauma, SDH and a right femur fracture.  She was transferred to [facility].   She underwent IMN on 5/2/2024.  Anticoagulation was held due to head bleed and has follow-up with neurosurgery on 5/10/2024.     In 5/10/2024, patient was  readmitted to the hospital concern of small bowel obstruction.  She did have large bowel movement in the emergency room.     On 5/22/24, pt saw ortho Managing pain with acetaminophen 1000mg TID, no narcotics. Compliant with the knee immobilizer although it does slide down her leg due to leg size. Has not been bending the knee but notes it sometimes bends slightly with transfers when the immobilizer is too low. Remains on lovenox. Chronic lower extremity edema issues, on furosemide, does not feel it has worsened since surgery.     NURSING FACILITY COURSE   Medication Changes/Rationale:   Summary of nursing facility stay:       (S72.401D) Closed fracture of distal end of right femur with routine healing, unspecified fracture morphology, subsequent encounter  (primary encounter diagnosis)  (Z47.89) Orthopedic aftercare  (M62.81) Generalized muscle weakness  (R53.81) Physical deconditioning  (Z91.81) Personal history of fall  Comment:   On 5/1/2024, patient fell resulting in facial trauma, SDH and a right femur fracture.  She was transferred to [facility].   She underwent IMN on 5/2/2024.   DVT prophylaxis - Lovenox was restarted on 5/13  Plavix was restarted on 5/14   Pain Medications - scheduled Tylenol 1000 mg TID   Bowel Medications - takes senna BID and miralax   Bone Health - takes calcium and Vitamin D  Weight bearing status - immobilizer on when out of bed, otherwise ok to leave off.  No weightbearing on RLE, no aggressive knee motion.    Incision - Keep knee incisions covered w/ either island dressing or ACE wrap to prevent rubbing on incisions from brace  -OK for incisions to get wet but do not scrub, soak, or submerge until end of June  On 5/22 saw ortho NP and stitches taken out  Follow up - Recheck w/ [facility] ortho at 6-weeks post-op (week of Guerita 10).  call to schedule:  [phone] suggest xrays prior AP/ Pelvis, Ap/ Lateral of the hip - possibly [facility] review on telehealth  Working with therapy   ,     (S06.5XAA)  Subdural hematoma (H)  (S02.40CD) Closed fracture of right side of maxilla with routine healing, subsequent encounter  (S00.83XD) Contusion of other part of head, subsequent encounter  (S01.81XD) Facial laceration, subsequent encounter  (Z86.73) History of TIA (transient ischemic attack) and stroke  (Z86.718) Personal history of DVT (deep vein thrombosis)  Comment:   pt fell hit head was on Plavix at that time.   Per ENT, Fracture only minimally displaced and non-operative management was recommended with soft diet.   Pt received 2 unit PRBC in hospital  Lovenox was restarted on 5/13  Plavix was restarted n 5/14  Had TBI clinic appt 05/24/24  - no new orders   Needs Dental referral for new upper dentures     (S32.010D) Compression fracture of L1 vertebra with routine healing, subsequent encounter  Comment: chronic  Tylenol for pain         (I10) Benign essential hypertension  Comment: chronic  Taking lisinopril       (N18.31) Stage 3a chronic kidney disease (H)  Comment: chronic  Baseline Cr ~0.8, GFR >60   Personally reviewed her BMP from 5/10/2024 and creatinine was stable  avoid nephrotoxic medications        (E11.22,  N18.31,  Z79.4) Type 2 diabetes mellitus with stage 3a chronic kidney disease, with long-term current use of insulin (H)  (E66.01) Morbid obesity (H)  (E11.9,  Z79.84) Diabetes mellitus treated with oral medication (H)  Comment: chronic  HgbA1c 6.9% 5/3/24   Taking Actos and Amaryl   monitor blood sugars - in the 1320-160s      (E03.9) acquired hypothyroidism  Comment: chronic  Taking levothyroxine.   monitor TSH yearly        (F32.A) Depression, unspecified depression type  Comment: chronic.   Taking citalopram     (Z85.42) History of uterine cancer  Comment: hx of        (K59.03) drug-induced constipation  Comment: Patient was hospitalized with potential small bowel obstruction did have bowel movement in the hospital.  Currently taking senna twice daily         Discharge Medications:  MED REC  "REQUIRED  Post Medication Reconciliation Status:  Discharge medications reconciled and changed, see notes/orders       Current Outpatient Medications   Medication Sig Dispense Refill    acetaminophen 500 MG CAPS Take 2 capsules by mouth 3 times daily      atorvastatin (LIPITOR) 20 MG tablet Take 1 tablet by mouth at bedtime      calcium carbonate 500 mg, elemental, (OSCAL) 500 MG tablet Take 500 mg by mouth 2 times daily      citalopram (CELEXA) 10 MG tablet Take 1 tablet by mouth every morning      clopidogrel (PLAVIX) 75 MG tablet Take 75 mg by mouth daily      enoxaparin ANTICOAGULANT (LOVENOX) 40 MG/0.4ML syringe Inject 40 mg Subcutaneous daily DVT ppx      famotidine (PEPCID) 40 MG tablet Take 20 mg by mouth 2 times daily      furosemide (LASIX) 20 MG tablet Take 20 mg by mouth daily      glimepiride (AMARYL) 1 MG tablet Take 2 mg by mouth every morning (before breakfast)      lisinopril (ZESTRIL) 5 MG tablet Take 5 mg by mouth at bedtime      magnesium oxide (MAG-OX) 400 MG tablet Take 400 mg by mouth daily      Multiple Vitamins-Minerals (CEROVITE SENIOR) TABS Take 1 tablet by mouth daily      pioglitazone (ACTOS) 30 MG tablet Take 1 tablet by mouth every morning      polyethylene glycol (PEG 3350) 17 g packet Take 17 g by mouth daily      senna (SENOKOT) 8.6 MG tablet Take 8.6 mg by mouth 2 times daily      vitamin D2 (ERGOCALCIFEROL) 30433 units (1250 mcg) capsule Take 50,000 Units by mouth once a week            Controlled medications:   not applicable/none     Past Medical History: No past medical history on file.  Physical Exam:   Vitals: /54   Pulse 80   Temp 97.5  F (36.4  C)   Resp 18   Ht 1.615 m (5' 3.6\")   Wt 115.6 kg (254 lb 14.4 oz)   SpO2 92%   BMI 44.31 kg/m    BMI: Body mass index is 44.31 kg/m .  GENERAL APPEARANCE:  Alert, in no distress, morbidly obese  RESP:  lungs clear to auscultation , no respiratory distress  CV:  rate-normal  PSYCH:  oriented X 3     SNF labs: Most Recent " 3 CBC's:  Recent Labs   Lab Test 05/20/24  0619   WBC 4.5   HGB 10.1*   *        Most Recent 3 BMP's:  Recent Labs   Lab Test 05/20/24  0619      POTASSIUM 4.3   CHLORIDE 106   CO2 27   BUN 23.2*   CR 0.74   ANIONGAP 9   VALERIY 9.2   *       DISCHARGE PLAN:  Follow up labs: BMP and CBC due 2 weeks to be drawn by home care with results to CBC  Medical Follow Up:      Follow up with primary care provider in 1-2 weeks   Will need xrays in 2 weeks for Right leg - work with HCM for weight bearing    Will need xayrs in beginning of August for right leg - work with [facility] [place]  Discharge Services: Home Care:  Occupational Therapy, Physical Therapy, and Home Health Aide  Discharge Instructions Verbalized to Patient at Discharge:   Weight bearing restrictions:  Non-weight bearing.     TOTAL DISCHARGE TIME:   Greater than 30 minutes  Electronically signed by:      CLIF Jade CNP       Documentation of Face to Face and Certification for Home Health Services    I certify that patient: Alana Sheikh is under my care and that I, or a nurse practitioner or physician's assistant working with me, had a face-to-face encounter that meets the physician face-to-face encounter requirements with this patient on: 5/31/2024.    This encounter with the patient was in whole, or in part, for the following medical condition, which is the primary reason for home health care:        Closed fracture of distal end of right femur with routine healing, unspecified fracture morphology, subsequent encounter  Orthopedic aftercare  Generalized muscle weakness  Physical deconditioning  Personal history of fall  SDH (subdural hematoma) (H)  Not bearing weight on lower extremity  Closed fracture of right side of maxilla with routine healing, subsequent encounter  Contusion of other part of head, subsequent encounter  Facial laceration, subsequent encounter  History of TIA (transient ischemic attack) and  stroke  Personal history of DVT (deep vein thrombosis)  Compression fracture of L1 vertebra with routine healing, subsequent encounter  Benign essential hypertension  Stage 3a chronic kidney disease (H)  Type 2 diabetes mellitus with stage 3a chronic kidney disease, without long-term current use of insulin (H)  Diabetes mellitus treated with oral medication (H)  Morbid obesity (H)  Acquired hypothyroidism  Depression, unspecified depression type  History of uterine cancer  Slow transit constipation  .    I certify that, based on my findings, the following services are medically necessary home health services: Occupational Therapy and Physical Therapy.    My clinical findings support the need for the above services because: Occupational Therapy Services are needed to assess and treat cognitive ability and address ADL safety due to impairment in mobility, ADLs, and cognition. and Physical Therapy Services are needed to assess and treat the following functional impairments: ADLS and mobility.    Further, I certify that my clinical findings support that this patient is homebound (i.e. absences from home require considerable and taxing effort and are for medical reasons or [facility] services or infrequently or of short duration when for other reasons) because: Requires assistance of another person or specialized equipment to access medical services because patient: Requires supervision of another for safe transfer...    Based on the above findings. I certify that this patient is confined to the home and needs intermittent skilled nursing care, physical therapy and/or speech therapy.  The patient is under my care, and I have initiated the establishment of the plan of care.  This patient will be followed by a physician who will periodically review the plan of care.  Physician/Provider to provide follow up care: Gilberto Huerta    Attending hospital physician (the Medicare certified [place] provider): CLIF Jade  CNP  Physician Signature: See electronic signature associated with these discharge orders.  Date: 5/31/2024

## 2024-05-31 NOTE — LETTER
5/31/2024        RE: Alana Sheikh  [address]  [place] [address]        [facility] GERIATRICS DISCHARGE SUMMARY  PATIENT'S NAME: Alana Sheikh  YOB: 1940  MEDICAL RECORD NUMBER:  [id number]  Place of Service where encounter took place:  [facility] (TCU) [[id number]]    PRIMARY CARE PROVIDER AND CLINIC RESPONSIBLE AFTER TRANSFER:   LASHAUN COBB MD, [address] [facility] / [place] [address]    [facility] Provider     Transferring providers: Althea MENEZES CNP; Ivana Stern MD  Recent Hospitalization/ED:  Hospital  [facility] stay 5/10/24 to 5/11/24.  Date of SNF Admission: May 11, 2024  Date of SNF (anticipated) Discharge:  6/3/2024  Discharged to: previous assisted living  Cognitive Scores: SLUMS: 24/30 and CPT: 4.7/5.6  Physical Function: Pt requires MAX AX2 with toileting, bathing, lower/ upper body dressing, transfer, grooming/oral care, bed mobility. Pt is independent with feeding after set-up.  DME: Hospital Bed and adama, wheelchair    CODE STATUS/ADVANCE DIRECTIVES DISCUSSION:  No CPR- Do NOT Intubate   ALLERGIES: Metformin    Past medical history includes  Distal right femur fracture s/p IMN 5/2/24   History of falls with injury   abnormal gait uses walker at baseline  Pain  Weakness/deconditioning  Subdural hematoma  Right maxillary alveolar ridge fracture  Right facial contusions with laceration  Chronic L1 compression fracture  Hypertension  CKD  Hypomagnesia  GERD  Diabetes type 2  Morbid obesity  Chronic lymphedema  History of TIA (2019) with MRI suggestive of subacute right frontal 4mm CVA   Depression  History of uterine cancer status post TEO/BSO  Remote history of DVT.  Not on anticoagulation     On 5/1/2024, patient presented in the [facility] emergency room after mechanical fall resulting in facial trauma, SDH and a right femur fracture.  She was transferred to [facility].   She underwent IMN on 5/2/2024.  Anticoagulation was held due to head  bleed and has follow-up with neurosurgery on 5/10/2024.     In 5/10/2024, patient was readmitted to the hospital concern of small bowel obstruction.  She did have large bowel movement in the emergency room.     On 5/22/24, pt saw ortho Managing pain with acetaminophen 1000mg TID, no narcotics. Compliant with the knee immobilizer although it does slide down her leg due to leg size. Has not been bending the knee but notes it sometimes bends slightly with transfers when the immobilizer is too low. Remains on lovenox. Chronic lower extremity edema issues, on furosemide, does not feel it has worsened since surgery.     NURSING FACILITY COURSE   Medication Changes/Rationale:   Summary of nursing facility stay:       (S74.833D) Closed fracture of distal end of right femur with routine healing, unspecified fracture morphology, subsequent encounter  (primary encounter diagnosis)  (Z47.89) Orthopedic aftercare  (M62.81) Generalized muscle weakness  (R53.81) Physical deconditioning  (Z91.81) Personal history of fall  Comment:   On 5/1/2024, patient fell resulting in facial trauma, SDH and a right femur fracture.  She was transferred to [facility].   She underwent IMN on 5/2/2024.   DVT prophylaxis - Lovenox was restarted on 5/13  Plavix was restarted on 5/14   Pain Medications - scheduled Tylenol 1000 mg TID   Bowel Medications - takes senna BID and miralax   Bone Health - takes calcium and Vitamin D  Weight bearing status - immobilizer on when out of bed, otherwise ok to leave off.  No weightbearing on RLE, no aggressive knee motion.    Incision - Keep knee incisions covered w/ either island dressing or ACE wrap to prevent rubbing on incisions from brace  -OK for incisions to get wet but do not scrub, soak, or submerge until end of June  On 5/22 saw ortho NP and stitches taken out  Follow up - Recheck w/ [facility] ortho at 6-weeks post-op (week of Guerita 10).  call to schedule:  [phone] suggest xrays prior AP/ Pelvis, Ap/ Lateral of  the hip - possibly [facility] review on telehealth  Working with therapy   ,     (S06.5XAA) Subdural hematoma (H)  (S02.40CD) Closed fracture of right side of maxilla with routine healing, subsequent encounter  (S00.83XD) Contusion of other part of head, subsequent encounter  (S01.81XD) Facial laceration, subsequent encounter  (Z86.73) History of TIA (transient ischemic attack) and stroke  (Z86.718) Personal history of DVT (deep vein thrombosis)  Comment:   pt fell hit head was on Plavix at that time.   Per ENT, Fracture only minimally displaced and non-operative management was recommended with soft diet.   Pt received 2 unit PRBC in hospital  Lovenox was restarted on 5/13  Plavix was restarted n 5/14  Had TBI clinic appt 05/24/24  - no new orders   Needs Dental referral for new upper dentures     (S32.010D) Compression fracture of L1 vertebra with routine healing, subsequent encounter  Comment: chronic  Tylenol for pain         (I10) Benign essential hypertension  Comment: chronic  Taking lisinopril       (N18.31) Stage 3a chronic kidney disease (H)  Comment: chronic  Baseline Cr ~0.8, GFR >60   Personally reviewed her BMP from 5/10/2024 and creatinine was stable  avoid nephrotoxic medications        (E11.22,  N18.31,  Z79.4) Type 2 diabetes mellitus with stage 3a chronic kidney disease, with long-term current use of insulin (H)  (E66.01) Morbid obesity (H)  (E11.9,  Z79.84) Diabetes mellitus treated with oral medication (H)  Comment: chronic  HgbA1c 6.9% 5/3/24   Taking Actos and Amaryl   monitor blood sugars - in the 1320-160s      (E03.9) acquired hypothyroidism  Comment: chronic  Taking levothyroxine.   monitor TSH yearly        (F32.A) Depression, unspecified depression type  Comment: chronic.   Taking citalopram     (Z85.42) History of uterine cancer  Comment: hx of        (K59.03) drug-induced constipation  Comment: Patient was hospitalized with potential small bowel obstruction did have bowel movement in the  "hospital.  Currently taking senna twice daily         Discharge Medications:  MED REC REQUIRED  Post Medication Reconciliation Status:  Discharge medications reconciled and changed, see notes/orders       Current Outpatient Medications   Medication Sig Dispense Refill     acetaminophen 500 MG CAPS Take 2 capsules by mouth 3 times daily       atorvastatin (LIPITOR) 20 MG tablet Take 1 tablet by mouth at bedtime       calcium carbonate 500 mg, elemental, (OSCAL) 500 MG tablet Take 500 mg by mouth 2 times daily       citalopram (CELEXA) 10 MG tablet Take 1 tablet by mouth every morning       clopidogrel (PLAVIX) 75 MG tablet Take 75 mg by mouth daily       enoxaparin ANTICOAGULANT (LOVENOX) 40 MG/0.4ML syringe Inject 40 mg Subcutaneous daily DVT ppx       famotidine (PEPCID) 40 MG tablet Take 20 mg by mouth 2 times daily       furosemide (LASIX) 20 MG tablet Take 20 mg by mouth daily       glimepiride (AMARYL) 1 MG tablet Take 2 mg by mouth every morning (before breakfast)       lisinopril (ZESTRIL) 5 MG tablet Take 5 mg by mouth at bedtime       magnesium oxide (MAG-OX) 400 MG tablet Take 400 mg by mouth daily       Multiple Vitamins-Minerals (CEROVITE SENIOR) TABS Take 1 tablet by mouth daily       pioglitazone (ACTOS) 30 MG tablet Take 1 tablet by mouth every morning       polyethylene glycol (PEG 3350) 17 g packet Take 17 g by mouth daily       senna (SENOKOT) 8.6 MG tablet Take 8.6 mg by mouth 2 times daily       vitamin D2 (ERGOCALCIFEROL) 95756 units (1250 mcg) capsule Take 50,000 Units by mouth once a week            Controlled medications:   not applicable/none     Past Medical History: No past medical history on file.  Physical Exam:   Vitals: /54   Pulse 80   Temp 97.5  F (36.4  C)   Resp 18   Ht 1.615 m (5' 3.6\")   Wt 115.6 kg (254 lb 14.4 oz)   SpO2 92%   BMI 44.31 kg/m    BMI: Body mass index is 44.31 kg/m .  GENERAL APPEARANCE:  Alert, in no distress, morbidly obese  RESP:  lungs clear to " auscultation , no respiratory distress  CV:  rate-normal  PSYCH:  oriented X 3     SNF labs: Most Recent 3 CBC's:  Recent Labs   Lab Test 05/20/24  0619   WBC 4.5   HGB 10.1*   *        Most Recent 3 BMP's:  Recent Labs   Lab Test 05/20/24  0619      POTASSIUM 4.3   CHLORIDE 106   CO2 27   BUN 23.2*   CR 0.74   ANIONGAP 9   VALERIY 9.2   *       DISCHARGE PLAN:  Follow up labs: BMP and CBC due 2 weeks to be drawn by home care with results to CBC  Medical Follow Up:      Follow up with primary care provider in 1-2 weeks   Will need xrays in 2 weeks for Right leg - work with [facility] for weight bearing    Will need xayrs in beginning of August for right leg - work with [facility] for weight bearing  Discharge Services: Home Care:  Occupational Therapy, Physical Therapy, and Home Health Aide  Discharge Instructions Verbalized to Patient at Discharge:   Weight bearing restrictions:  Non-weight bearing.     TOTAL DISCHARGE TIME:   Greater than 30 minutes  Electronically signed by:      CLIF Jade CNP       Documentation of Face to Face and Certification for Home Health Services    I certify that patient: Alana Sheikh is under my care and that I, or a nurse practitioner or physician's assistant working with me, had a face-to-face encounter that meets the physician face-to-face encounter requirements with this patient on: 5/31/2024.    This encounter with the patient was in whole, or in part, for the following medical condition, which is the primary reason for home health care:        Closed fracture of distal end of right femur with routine healing, unspecified fracture morphology, subsequent encounter  Orthopedic aftercare  Generalized muscle weakness  Physical deconditioning  Personal history of fall  SDH (subdural hematoma) (H)  Not bearing weight on lower extremity  Closed fracture of right side of maxilla with routine healing, subsequent encounter  Contusion of other part of head,  subsequent encounter  Facial laceration, subsequent encounter  History of TIA (transient ischemic attack) and stroke  Personal history of DVT (deep vein thrombosis)  Compression fracture of L1 vertebra with routine healing, subsequent encounter  Benign essential hypertension  Stage 3a chronic kidney disease (H)  Type 2 diabetes mellitus with stage 3a chronic kidney disease, without long-term current use of insulin (H)  Diabetes mellitus treated with oral medication (H)  Morbid obesity (H)  Acquired hypothyroidism  Depression, unspecified depression type  History of uterine cancer  Slow transit constipation  .    I certify that, based on my findings, the following services are medically necessary home health services: Occupational Therapy and Physical Therapy.    My clinical findings support the need for the above services because: Occupational Therapy Services are needed to assess and treat cognitive ability and address ADL safety due to impairment in mobility, ADLs, and cognition. and Physical Therapy Services are needed to assess and treat the following functional impairments: ADLS and mobility.    Further, I certify that my clinical findings support that this patient is homebound (i.e. absences from home require considerable and taxing effort and are for medical reasons or Worship services or infrequently or of short duration when for other reasons) because: Requires assistance of another person or specialized equipment to access medical services because patient: Requires supervision of another for safe transfer...    Based on the above findings. I certify that this patient is confined to the home and needs intermittent skilled nursing care, physical therapy and/or speech therapy.  The patient is under my care, and I have initiated the establishment of the plan of care.  This patient will be followed by a physician who will periodically review the plan of care.  Physician/Provider to provide follow up care:  Gilberto Huerta    Attending hospital physician (the Medicare certified [facility] provider): CLIF Jade CNP  Physician Signature: See electronic signature associated with these discharge orders.  Date: 5/31/2024               Sincerely,        CLIF Jade CNP